# Patient Record
Sex: FEMALE | Race: WHITE | NOT HISPANIC OR LATINO | Employment: PART TIME | URBAN - METROPOLITAN AREA
[De-identification: names, ages, dates, MRNs, and addresses within clinical notes are randomized per-mention and may not be internally consistent; named-entity substitution may affect disease eponyms.]

---

## 2020-05-19 ENCOUNTER — APPOINTMENT (EMERGENCY)
Dept: RADIOLOGY | Facility: HOSPITAL | Age: 60
End: 2020-05-19
Payer: COMMERCIAL

## 2020-05-19 ENCOUNTER — HOSPITAL ENCOUNTER (EMERGENCY)
Facility: HOSPITAL | Age: 60
Discharge: HOME | End: 2020-05-19
Attending: EMERGENCY MEDICINE | Admitting: EMERGENCY MEDICINE
Payer: COMMERCIAL

## 2020-05-19 VITALS
OXYGEN SATURATION: 98 % | WEIGHT: 244.93 LBS | HEIGHT: 62 IN | SYSTOLIC BLOOD PRESSURE: 194 MMHG | BODY MASS INDEX: 45.07 KG/M2 | HEART RATE: 86 BPM | DIASTOLIC BLOOD PRESSURE: 88 MMHG | TEMPERATURE: 98.7 F | RESPIRATION RATE: 20 BRPM

## 2020-05-19 DIAGNOSIS — R03.0 ELEVATED BLOOD PRESSURE READING: ICD-10-CM

## 2020-05-19 DIAGNOSIS — G47.00 INSOMNIA: ICD-10-CM

## 2020-05-19 DIAGNOSIS — R07.89 CHEST PRESSURE: Primary | ICD-10-CM

## 2020-05-19 DIAGNOSIS — J20.9 ACUTE BRONCHITIS: ICD-10-CM

## 2020-05-19 LAB
ALBUMIN SERPL BCP-MCNC: 3.5 G/DL (ref 3.5–5)
ALP SERPL-CCNC: 78 U/L (ref 46–116)
ALT SERPL W P-5'-P-CCNC: 32 U/L (ref 12–78)
ANION GAP SERPL CALCULATED.3IONS-SCNC: 9 MMOL/L (ref 4–13)
AST SERPL W P-5'-P-CCNC: 21 U/L (ref 5–45)
ATRIAL RATE: 85 BPM
BASOPHILS # BLD AUTO: 0.01 THOUSANDS/ΜL (ref 0–0.1)
BASOPHILS NFR BLD AUTO: 0 % (ref 0–1)
BILIRUB SERPL-MCNC: 0.46 MG/DL (ref 0.2–1)
BUN SERPL-MCNC: 12 MG/DL (ref 5–25)
CALCIUM SERPL-MCNC: 8.6 MG/DL (ref 8.3–10.1)
CHLORIDE SERPL-SCNC: 111 MMOL/L (ref 100–108)
CO2 SERPL-SCNC: 26 MMOL/L (ref 21–32)
CREAT SERPL-MCNC: 0.74 MG/DL (ref 0.6–1.3)
D DIMER PPP FEU-MCNC: 0.27 UG/ML FEU
EOSINOPHIL # BLD AUTO: 0.11 THOUSAND/ΜL (ref 0–0.61)
EOSINOPHIL NFR BLD AUTO: 2 % (ref 0–6)
ERYTHROCYTE [DISTWIDTH] IN BLOOD BY AUTOMATED COUNT: 13.9 % (ref 11.6–15.1)
GFR SERPL CREATININE-BSD FRML MDRD: 89 ML/MIN/1.73SQ M
GLUCOSE SERPL-MCNC: 102 MG/DL (ref 65–140)
HCT VFR BLD AUTO: 43.1 % (ref 34.8–46.1)
HGB BLD-MCNC: 14.2 G/DL (ref 11.5–15.4)
IMM GRANULOCYTES # BLD AUTO: 0.03 THOUSAND/UL (ref 0–0.2)
IMM GRANULOCYTES NFR BLD AUTO: 1 % (ref 0–2)
LYMPHOCYTES # BLD AUTO: 1.36 THOUSANDS/ΜL (ref 0.6–4.47)
LYMPHOCYTES NFR BLD AUTO: 27 % (ref 14–44)
MCH RBC QN AUTO: 32.9 PG (ref 26.8–34.3)
MCHC RBC AUTO-ENTMCNC: 32.9 G/DL (ref 31.4–37.4)
MCV RBC AUTO: 100 FL (ref 82–98)
MONOCYTES # BLD AUTO: 0.38 THOUSAND/ΜL (ref 0.17–1.22)
MONOCYTES NFR BLD AUTO: 8 % (ref 4–12)
NEUTROPHILS # BLD AUTO: 3.12 THOUSANDS/ΜL (ref 1.85–7.62)
NEUTS SEG NFR BLD AUTO: 62 % (ref 43–75)
NRBC BLD AUTO-RTO: 0 /100 WBCS
NT-PROBNP SERPL-MCNC: 82 PG/ML
P AXIS: 61 DEGREES
PLATELET # BLD AUTO: 256 THOUSANDS/UL (ref 149–390)
PMV BLD AUTO: 8.7 FL (ref 8.9–12.7)
POTASSIUM SERPL-SCNC: 4.2 MMOL/L (ref 3.5–5.3)
PR INTERVAL: 160 MS
PROT SERPL-MCNC: 7.1 G/DL (ref 6.4–8.2)
QRS AXIS: 41 DEGREES
QRSD INTERVAL: 80 MS
QT INTERVAL: 344 MS
QTC INTERVAL: 409 MS
RBC # BLD AUTO: 4.31 MILLION/UL (ref 3.81–5.12)
SARS-COV-2 RNA RESP QL NAA+PROBE: NEGATIVE
SODIUM SERPL-SCNC: 146 MMOL/L (ref 136–145)
T WAVE AXIS: 58 DEGREES
TROPONIN I SERPL-MCNC: <0.02 NG/ML
VENTRICULAR RATE: 85 BPM
WBC # BLD AUTO: 5.01 THOUSAND/UL (ref 4.31–10.16)

## 2020-05-19 PROCEDURE — 71045 X-RAY EXAM CHEST 1 VIEW: CPT

## 2020-05-19 PROCEDURE — 87635 SARS-COV-2 COVID-19 AMP PRB: CPT | Performed by: PHYSICIAN ASSISTANT

## 2020-05-19 PROCEDURE — 84484 ASSAY OF TROPONIN QUANT: CPT | Performed by: PHYSICIAN ASSISTANT

## 2020-05-19 PROCEDURE — 85379 FIBRIN DEGRADATION QUANT: CPT | Performed by: PHYSICIAN ASSISTANT

## 2020-05-19 PROCEDURE — 93010 ELECTROCARDIOGRAM REPORT: CPT | Performed by: INTERNAL MEDICINE

## 2020-05-19 PROCEDURE — 83880 ASSAY OF NATRIURETIC PEPTIDE: CPT | Performed by: PHYSICIAN ASSISTANT

## 2020-05-19 PROCEDURE — 93005 ELECTROCARDIOGRAM TRACING: CPT

## 2020-05-19 PROCEDURE — 80053 COMPREHEN METABOLIC PANEL: CPT | Performed by: PHYSICIAN ASSISTANT

## 2020-05-19 PROCEDURE — 85025 COMPLETE CBC W/AUTO DIFF WBC: CPT | Performed by: PHYSICIAN ASSISTANT

## 2020-05-19 PROCEDURE — 36415 COLL VENOUS BLD VENIPUNCTURE: CPT | Performed by: PHYSICIAN ASSISTANT

## 2020-05-19 PROCEDURE — 99284 EMERGENCY DEPT VISIT MOD MDM: CPT | Performed by: PHYSICIAN ASSISTANT

## 2020-05-19 PROCEDURE — 99285 EMERGENCY DEPT VISIT HI MDM: CPT

## 2020-05-19 RX ORDER — ALBUTEROL SULFATE 90 UG/1
2 AEROSOL, METERED RESPIRATORY (INHALATION) ONCE
Status: COMPLETED | OUTPATIENT
Start: 2020-05-19 | End: 2020-05-19

## 2020-05-19 RX ORDER — ALBUTEROL SULFATE 90 UG/1
2 AEROSOL, METERED RESPIRATORY (INHALATION) EVERY 4 HOURS PRN
Qty: 1 INHALER | Refills: 0 | Status: SHIPPED | OUTPATIENT
Start: 2020-05-19 | End: 2020-05-19 | Stop reason: SDUPTHER

## 2020-05-19 RX ORDER — ZOLPIDEM TARTRATE 5 MG/1
5 TABLET ORAL
Qty: 10 TABLET | Refills: 0 | Status: SHIPPED | OUTPATIENT
Start: 2020-05-19 | End: 2020-09-30

## 2020-05-19 RX ORDER — PREDNISONE 10 MG/1
10 TABLET ORAL
Qty: 42 TABLET | Refills: 0 | Status: SHIPPED | OUTPATIENT
Start: 2020-05-19 | End: 2020-09-30

## 2020-05-19 RX ORDER — ALBUTEROL SULFATE 90 UG/1
2 AEROSOL, METERED RESPIRATORY (INHALATION) EVERY 4 HOURS PRN
Qty: 1 INHALER | Refills: 0 | Status: SHIPPED | OUTPATIENT
Start: 2020-05-19 | End: 2020-09-30

## 2020-05-19 RX ORDER — PREDNISONE 10 MG/1
10 TABLET ORAL
Qty: 42 TABLET | Refills: 0 | Status: SHIPPED | OUTPATIENT
Start: 2020-05-19 | End: 2020-05-19 | Stop reason: SDUPTHER

## 2020-05-19 RX ADMIN — ALBUTEROL SULFATE 2 PUFF: 90 AEROSOL, METERED RESPIRATORY (INHALATION) at 09:14

## 2020-08-28 ENCOUNTER — OFFICE VISIT (OUTPATIENT)
Dept: FAMILY MEDICINE CLINIC | Facility: CLINIC | Age: 60
End: 2020-08-28
Payer: COMMERCIAL

## 2020-08-28 ENCOUNTER — TELEPHONE (OUTPATIENT)
Dept: FAMILY MEDICINE CLINIC | Facility: CLINIC | Age: 60
End: 2020-08-28

## 2020-08-28 VITALS
BODY MASS INDEX: 45.82 KG/M2 | WEIGHT: 249 LBS | DIASTOLIC BLOOD PRESSURE: 90 MMHG | HEIGHT: 62 IN | SYSTOLIC BLOOD PRESSURE: 134 MMHG | HEART RATE: 102 BPM | TEMPERATURE: 98 F | OXYGEN SATURATION: 98 % | RESPIRATION RATE: 14 BRPM

## 2020-08-28 DIAGNOSIS — R53.83 OTHER FATIGUE: ICD-10-CM

## 2020-08-28 DIAGNOSIS — Z12.11 SCREEN FOR COLON CANCER: ICD-10-CM

## 2020-08-28 DIAGNOSIS — G47.00 INSOMNIA, UNSPECIFIED TYPE: ICD-10-CM

## 2020-08-28 DIAGNOSIS — Z12.31 SCREENING MAMMOGRAM FOR HIGH-RISK PATIENT: ICD-10-CM

## 2020-08-28 DIAGNOSIS — L03.119 CELLULITIS OF ELBOW: ICD-10-CM

## 2020-08-28 DIAGNOSIS — L03.119 CELLULITIS OF ELBOW: Primary | ICD-10-CM

## 2020-08-28 DIAGNOSIS — R06.02 SOB (SHORTNESS OF BREATH): ICD-10-CM

## 2020-08-28 DIAGNOSIS — Z13.220 SCREENING CHOLESTEROL LEVEL: ICD-10-CM

## 2020-08-28 PROCEDURE — 3725F SCREEN DEPRESSION PERFORMED: CPT | Performed by: FAMILY MEDICINE

## 2020-08-28 PROCEDURE — 3008F BODY MASS INDEX DOCD: CPT | Performed by: FAMILY MEDICINE

## 2020-08-28 PROCEDURE — 1036F TOBACCO NON-USER: CPT | Performed by: FAMILY MEDICINE

## 2020-08-28 PROCEDURE — 99244 OFF/OP CNSLTJ NEW/EST MOD 40: CPT | Performed by: FAMILY MEDICINE

## 2020-08-28 RX ORDER — CEPHALEXIN 500 MG/1
500 CAPSULE ORAL EVERY 6 HOURS SCHEDULED
Qty: 40 CAPSULE | Refills: 0 | Status: SHIPPED | OUTPATIENT
Start: 2020-08-28 | End: 2020-08-28 | Stop reason: SDUPTHER

## 2020-08-28 RX ORDER — CEPHALEXIN 500 MG/1
500 CAPSULE ORAL EVERY 6 HOURS SCHEDULED
Qty: 40 CAPSULE | Refills: 0 | Status: SHIPPED | OUTPATIENT
Start: 2020-08-28 | End: 2020-09-07

## 2020-08-28 RX ORDER — ZOLPIDEM TARTRATE 10 MG/1
10 TABLET ORAL
Qty: 30 TABLET | Refills: 1 | Status: SHIPPED | OUTPATIENT
Start: 2020-08-28 | End: 2020-08-28 | Stop reason: SDUPTHER

## 2020-08-28 RX ORDER — ZOLPIDEM TARTRATE 10 MG/1
10 TABLET ORAL
Qty: 30 TABLET | Refills: 1 | Status: SHIPPED | OUTPATIENT
Start: 2020-08-28 | End: 2020-10-06 | Stop reason: SDUPTHER

## 2020-08-28 NOTE — PROGRESS NOTES
BMI Counseling: Body mass index is 45 54 kg/m²  The BMI is above normal  Nutrition recommendations include decreasing portion sizes, encouraging healthy choices of fruits and vegetables, limiting drinks that contain sugar, moderation in carbohydrate intake, increasing intake of lean protein, reducing intake of saturated and trans fat and reducing intake of cholesterol  Exercise recommendations include exercising 3-5 times per week  No pharmacotherapy was ordered  Assessment/Plan:      There are no diagnoses linked to this encounter  Subjective:     Patient ID: Katelynn Johnson is a 61 y o  female  Pt  For her initil exam today  Pt  With some issues  With cough and wheezing  Pt states thus has  Shyam [place] going on for a while  Pt  Was a part time  Smoker  And  A pack would last a week  Pt  hada  Cigarette  Months  Ago and is  Not  Smoking  Pt  Also with insomnia  Pt has  Used  Melatonin and  nyquil and  Not really helping  Pt  Will use ETOH at  [facility] to help  Her sleep  Pt does  Have issues with sleep  And will wake up with  Gasping  Pt with an Epsworth sleep scale of 11         Review of Systems      Objective:     Physical Exam

## 2020-08-28 NOTE — TELEPHONE ENCOUNTER
Pt  called, she said she needed the Keflex and Ambian to be sent to [facility] [place]  [address] on [address]

## 2020-09-10 ENCOUNTER — TELEPHONE (OUTPATIENT)
Dept: FAMILY MEDICINE CLINIC | Facility: CLINIC | Age: 60
End: 2020-09-10

## 2020-09-10 DIAGNOSIS — B37.9 YEAST INFECTION: Primary | ICD-10-CM

## 2020-09-10 RX ORDER — FLUCONAZOLE 150 MG/1
150 TABLET ORAL ONCE
Qty: 1 TABLET | Refills: 0 | Status: SHIPPED | OUTPATIENT
Start: 2020-09-10 | End: 2020-09-10

## 2020-09-10 NOTE — TELEPHONE ENCOUNTER
Patient was given keflex at her last visit for cellulitis and has since developed a yeast infection   Could you please send in diflucan for her to [facility]

## 2020-09-17 ENCOUNTER — HOSPITAL ENCOUNTER (OUTPATIENT)
Dept: PULMONOLOGY | Facility: HOSPITAL | Age: 60
Discharge: HOME | End: 2020-09-17
Payer: COMMERCIAL

## 2020-09-17 DIAGNOSIS — R06.02 SOB (SHORTNESS OF BREATH): ICD-10-CM

## 2020-09-17 PROCEDURE — 94010 BREATHING CAPACITY TEST: CPT | Performed by: INTERNAL MEDICINE

## 2020-09-17 PROCEDURE — 94760 N-INVAS EAR/PLS OXIMETRY 1: CPT

## 2020-09-17 PROCEDURE — 94727 GAS DIL/WSHOT DETER LNG VOL: CPT | Performed by: INTERNAL MEDICINE

## 2020-09-17 PROCEDURE — 94010 BREATHING CAPACITY TEST: CPT

## 2020-09-17 PROCEDURE — 94729 DIFFUSING CAPACITY: CPT | Performed by: INTERNAL MEDICINE

## 2020-09-17 PROCEDURE — 94727 GAS DIL/WSHOT DETER LNG VOL: CPT

## 2020-09-17 PROCEDURE — 94729 DIFFUSING CAPACITY: CPT

## 2020-09-18 ENCOUNTER — OFFICE VISIT (OUTPATIENT)
Dept: FAMILY MEDICINE CLINIC | Facility: CLINIC | Age: 60
End: 2020-09-18
Payer: COMMERCIAL

## 2020-09-18 VITALS
OXYGEN SATURATION: 95 % | WEIGHT: 250 LBS | SYSTOLIC BLOOD PRESSURE: 148 MMHG | HEIGHT: 62 IN | RESPIRATION RATE: 18 BRPM | BODY MASS INDEX: 46.01 KG/M2 | DIASTOLIC BLOOD PRESSURE: 98 MMHG | HEART RATE: 92 BPM | TEMPERATURE: 98.6 F

## 2020-09-18 DIAGNOSIS — B35.6 TINEA CRURIS: Primary | ICD-10-CM

## 2020-09-18 DIAGNOSIS — G47.00 INSOMNIA, UNSPECIFIED TYPE: ICD-10-CM

## 2020-09-18 DIAGNOSIS — Z01.419 WELL FEMALE EXAM WITH ROUTINE GYNECOLOGICAL EXAM: ICD-10-CM

## 2020-09-18 DIAGNOSIS — K44.9 HIATAL HERNIA: ICD-10-CM

## 2020-09-18 DIAGNOSIS — R06.02 SOB (SHORTNESS OF BREATH): ICD-10-CM

## 2020-09-18 PROCEDURE — 99213 OFFICE O/P EST LOW 20 MIN: CPT | Performed by: FAMILY MEDICINE

## 2020-09-18 RX ORDER — KETOCONAZOLE 20 MG/G
CREAM TOPICAL DAILY
Qty: 60 G | Refills: 1 | Status: SHIPPED | OUTPATIENT
Start: 2020-09-18 | End: 2021-02-17 | Stop reason: CLARIF

## 2020-09-18 RX ORDER — EPINEPHRINE INHALATION 125 UG/1
AEROSOL RESPIRATORY (INHALATION)
COMMUNITY
End: 2021-02-17 | Stop reason: CLARIF

## 2020-09-18 NOTE — PROGRESS NOTES
Assessment/Plan:         Problem List Items Addressed This Visit     None            Subjective:      Patient ID: Shivani Sood is a 61 y o  female  Pt here for  cehckup on yeast infestion in her vaginal area  Pt  states she had keflex  Previously and had  A yeast infection and had  Diflucan cause   Diarrhea  Pt also with  A question  About a hiatal hernia and would like to be checked for that     Pt with heartburn and belching  The following portions of the patient's history were reviewed and updated as appropriate:   She has a past medical history of Asthma ,  does not have any pertinent problems on file  ,   has no past surgical history on file  ,  family history includes Bipolar disorder in her son; Heart disease in her mother and sister; Stroke in her mother  ,   reports that she quit smoking about 6 months ago  She has never used smokeless tobacco  She reports current alcohol use of about 1 0 standard drinks of alcohol per week  She reports that she does not use drugs  ,  is allergic to augmentin [amoxicillin-pot clavulanate]     Current Outpatient Medications   Medication Sig Dispense Refill    EPINEPHrine (Primatene Mist) 0 125 MG/ACT AERO Inhale      zolpidem (AMBIEN) 10 mg tablet Take 1 tablet (10 mg total) by mouth daily at bedtime as needed for sleep 30 tablet 1    albuterol (PROVENTIL HFA,VENTOLIN HFA) 90 mcg/act inhaler Inhale 2 puffs every 4 (four) hours as needed for wheezing or shortness of breath (Patient not taking: Reported on 9/18/2020) 1 Inhaler 0    predniSONE 10 mg tablet Take 1 tablet (10 mg total) by mouth daily with breakfast 6 tabs PO qDaily x2 days, 5 tabs PO qD x 2d, 4 tabs PO qD x 2d, 3 tabs PO qD x 2d, 2 tabs PO qD x 2d, 1 tab PO qD x 2d (Patient not taking: Reported on 8/28/2020) 42 tablet 0    zolpidem (AMBIEN) 5 mg tablet Take 1 tablet (5 mg total) by mouth daily at bedtime as needed for sleep (Patient not taking: Reported on 8/28/2020) 10 tablet 0     No current facility-administered medications for this visit  Review of Systems   Constitutional: Negative for activity change, appetite change, fatigue and fever  HENT: Negative for congestion, ear pain, postnasal drip, rhinorrhea, sinus pressure, sinus pain, sneezing and sore throat  Eyes: Negative for pain and redness  Respiratory: Negative for apnea, cough, chest tightness, shortness of breath and wheezing  Cardiovascular: Negative for chest pain, palpitations and leg swelling  Gastrointestinal: Negative for abdominal pain, constipation, diarrhea, nausea and vomiting  Endocrine: Negative for cold intolerance and heat intolerance  Genitourinary: Negative for difficulty urinating, dysuria, frequency, hematuria and urgency  Musculoskeletal: Negative for arthralgias, back pain, gait problem and myalgias  Skin: Positive for rash  Neurological: Negative for dizziness, speech difficulty, weakness, numbness and headaches  Hematological: Does not bruise/bleed easily  Psychiatric/Behavioral: Negative for agitation, confusion and hallucinations  Objective:  Vitals:    09/18/20 1426   BP: 148/98   BP Location: Left arm   Patient Position: Sitting   Cuff Size: Adult   Pulse: 92   Resp: 18   Temp: 98 6 °F (37 °C)   TempSrc: Temporal   SpO2: 95%   Weight: 113 kg (250 lb)   Height: 5' 2" (1 575 m)     Body mass index is 45 73 kg/m²  Physical Exam  Vitals signs and nursing note reviewed  Constitutional:       Appearance: She is well-developed  HENT:      Head: Normocephalic and atraumatic  Nose: Nose normal    Eyes:      General: No scleral icterus  Conjunctiva/sclera: Conjunctivae normal       Pupils: Pupils are equal, round, and reactive to light  Neck:      Musculoskeletal: Normal range of motion and neck supple  Thyroid: No thyromegaly  Cardiovascular:      Rate and Rhythm: Normal rate and regular rhythm     Pulmonary:      Effort: Pulmonary effort is normal       Breath sounds: Normal breath sounds  No wheezing  Abdominal:      General: Bowel sounds are normal  There is no distension  Palpations: Abdomen is soft  Tenderness: There is no abdominal tenderness  There is no guarding or rebound  Musculoskeletal: Normal range of motion  General: No tenderness or deformity  Skin:     General: Skin is warm and dry  Findings: No erythema or rash  Neurological:      Mental Status: She is alert and oriented to person, place, and time  Sensory: No sensory deficit  Psychiatric:         Behavior: Behavior normal          Thought Content:  Thought content normal          Judgment: Judgment normal

## 2020-09-28 ENCOUNTER — HOSPITAL ENCOUNTER (OUTPATIENT)
Dept: MAMMOGRAPHY | Facility: CLINIC | Age: 60
Discharge: HOME | End: 2020-09-28
Payer: COMMERCIAL

## 2020-09-28 ENCOUNTER — APPOINTMENT (OUTPATIENT)
Dept: LAB | Facility: HOSPITAL | Age: 60
End: 2020-09-28
Payer: COMMERCIAL

## 2020-09-28 VITALS — BODY MASS INDEX: 49.08 KG/M2 | WEIGHT: 250 LBS | HEIGHT: 60 IN

## 2020-09-28 DIAGNOSIS — R53.83 OTHER FATIGUE: Primary | ICD-10-CM

## 2020-09-28 DIAGNOSIS — Z12.31 SCREENING MAMMOGRAM FOR HIGH-RISK PATIENT: ICD-10-CM

## 2020-09-28 DIAGNOSIS — Z13.220 SCREENING CHOLESTEROL LEVEL: ICD-10-CM

## 2020-09-28 LAB
ALBUMIN SERPL BCP-MCNC: 3.2 G/DL (ref 3.5–5)
ALP SERPL-CCNC: 92 U/L (ref 46–116)
ALT SERPL W P-5'-P-CCNC: 32 U/L (ref 12–78)
ANION GAP SERPL CALCULATED.3IONS-SCNC: 8 MMOL/L (ref 4–13)
AST SERPL W P-5'-P-CCNC: 22 U/L (ref 5–45)
BACTERIA UR QL AUTO: ABNORMAL /HPF
BASOPHILS # BLD AUTO: 0.02 THOUSANDS/ΜL (ref 0–0.1)
BASOPHILS NFR BLD AUTO: 0 % (ref 0–1)
BILIRUB SERPL-MCNC: 0.3 MG/DL (ref 0.2–1)
BILIRUB UR QL STRIP: NEGATIVE
BUN SERPL-MCNC: 7 MG/DL (ref 5–25)
CALCIUM ALBUM COR SERPL-MCNC: 9.8 MG/DL (ref 8.3–10.1)
CALCIUM SERPL-MCNC: 9.2 MG/DL (ref 8.3–10.1)
CHLORIDE SERPL-SCNC: 108 MMOL/L (ref 100–108)
CHOLEST SERPL-MCNC: 262 MG/DL (ref 50–200)
CLARITY UR: ABNORMAL
CO2 SERPL-SCNC: 31 MMOL/L (ref 21–32)
COLOR UR: YELLOW
CREAT SERPL-MCNC: 0.75 MG/DL (ref 0.6–1.3)
EOSINOPHIL # BLD AUTO: 0.12 THOUSAND/ΜL (ref 0–0.61)
EOSINOPHIL NFR BLD AUTO: 2 % (ref 0–6)
ERYTHROCYTE [DISTWIDTH] IN BLOOD BY AUTOMATED COUNT: 12.9 % (ref 11.6–15.1)
GFR SERPL CREATININE-BSD FRML MDRD: 87 ML/MIN/1.73SQ M
GLUCOSE P FAST SERPL-MCNC: 94 MG/DL (ref 65–99)
GLUCOSE UR STRIP-MCNC: NEGATIVE MG/DL
HCT VFR BLD AUTO: 41.8 % (ref 34.8–46.1)
HDLC SERPL-MCNC: 79 MG/DL
HGB BLD-MCNC: 13.1 G/DL (ref 11.5–15.4)
HGB UR QL STRIP.AUTO: NEGATIVE
IMM GRANULOCYTES # BLD AUTO: 0.03 THOUSAND/UL (ref 0–0.2)
IMM GRANULOCYTES NFR BLD AUTO: 1 % (ref 0–2)
KETONES UR STRIP-MCNC: NEGATIVE MG/DL
LDLC SERPL CALC-MCNC: 152 MG/DL (ref 0–100)
LEUKOCYTE ESTERASE UR QL STRIP: ABNORMAL
LYMPHOCYTES # BLD AUTO: 1.64 THOUSANDS/ΜL (ref 0.6–4.47)
LYMPHOCYTES NFR BLD AUTO: 29 % (ref 14–44)
MAGNESIUM SERPL-MCNC: 2.1 MG/DL (ref 1.6–2.6)
MCH RBC QN AUTO: 32.4 PG (ref 26.8–34.3)
MCHC RBC AUTO-ENTMCNC: 31.3 G/DL (ref 31.4–37.4)
MCV RBC AUTO: 104 FL (ref 82–98)
MONOCYTES # BLD AUTO: 0.44 THOUSAND/ΜL (ref 0.17–1.22)
MONOCYTES NFR BLD AUTO: 8 % (ref 4–12)
NEUTROPHILS # BLD AUTO: 3.38 THOUSANDS/ΜL (ref 1.85–7.62)
NEUTS SEG NFR BLD AUTO: 60 % (ref 43–75)
NITRITE UR QL STRIP: NEGATIVE
NON-SQ EPI CELLS URNS QL MICRO: ABNORMAL /HPF
NRBC BLD AUTO-RTO: 0 /100 WBCS
PH UR STRIP.AUTO: 5.5 [PH]
PLATELET # BLD AUTO: 325 THOUSANDS/UL (ref 149–390)
PMV BLD AUTO: 9 FL (ref 8.9–12.7)
POTASSIUM SERPL-SCNC: 3.6 MMOL/L (ref 3.5–5.3)
PROT SERPL-MCNC: 7.4 G/DL (ref 6.4–8.2)
PROT UR STRIP-MCNC: NEGATIVE MG/DL
RBC # BLD AUTO: 4.04 MILLION/UL (ref 3.81–5.12)
RBC #/AREA URNS AUTO: ABNORMAL /HPF
SODIUM SERPL-SCNC: 147 MMOL/L (ref 136–145)
SP GR UR STRIP.AUTO: 1.02 (ref 1–1.03)
T4 FREE SERPL-MCNC: 0.94 NG/DL (ref 0.76–1.46)
TRIGL SERPL-MCNC: 156 MG/DL
TSH SERPL DL<=0.05 MIU/L-ACNC: 0.96 UIU/ML (ref 0.36–3.74)
URATE SERPL-MCNC: 6.2 MG/DL (ref 2–6.8)
UROBILINOGEN UR QL STRIP.AUTO: 0.2 E.U./DL
WBC # BLD AUTO: 5.63 THOUSAND/UL (ref 4.31–10.16)
WBC #/AREA URNS AUTO: ABNORMAL /HPF

## 2020-09-28 PROCEDURE — 77063 BREAST TOMOSYNTHESIS BI: CPT

## 2020-09-28 PROCEDURE — 77067 SCR MAMMO BI INCL CAD: CPT

## 2020-09-28 PROCEDURE — 80053 COMPREHEN METABOLIC PANEL: CPT

## 2020-09-28 PROCEDURE — 84443 ASSAY THYROID STIM HORMONE: CPT

## 2020-09-28 PROCEDURE — 36415 COLL VENOUS BLD VENIPUNCTURE: CPT

## 2020-09-28 PROCEDURE — 81001 URINALYSIS AUTO W/SCOPE: CPT | Performed by: FAMILY MEDICINE

## 2020-09-28 PROCEDURE — 83735 ASSAY OF MAGNESIUM: CPT

## 2020-09-28 PROCEDURE — 85025 COMPLETE CBC W/AUTO DIFF WBC: CPT

## 2020-09-28 PROCEDURE — 84439 ASSAY OF FREE THYROXINE: CPT

## 2020-09-28 PROCEDURE — 80061 LIPID PANEL: CPT

## 2020-09-28 PROCEDURE — 84550 ASSAY OF BLOOD/URIC ACID: CPT

## 2020-09-29 ENCOUNTER — ANNUAL EXAM (OUTPATIENT)
Dept: OBGYN CLINIC | Facility: CLINIC | Age: 60
End: 2020-09-29
Payer: COMMERCIAL

## 2020-09-29 VITALS — HEIGHT: 60 IN | WEIGHT: 250 LBS | BODY MASS INDEX: 49.08 KG/M2

## 2020-09-29 DIAGNOSIS — Z01.411 ENCOUNTER FOR GYNECOLOGICAL EXAMINATION WITH ABNORMAL FINDING: Primary | ICD-10-CM

## 2020-09-29 DIAGNOSIS — N76.0 ACUTE VAGINITIS: ICD-10-CM

## 2020-09-29 DIAGNOSIS — Z12.4 SCREENING FOR MALIGNANT NEOPLASM OF THE CERVIX: ICD-10-CM

## 2020-09-29 DIAGNOSIS — Z13.820 SCREENING FOR OSTEOPOROSIS: ICD-10-CM

## 2020-09-29 DIAGNOSIS — Z11.51 SCREENING FOR HUMAN PAPILLOMAVIRUS: ICD-10-CM

## 2020-09-29 PROCEDURE — G0145 SCR C/V CYTO,THINLAYER,RESCR: HCPCS | Performed by: OBSTETRICS & GYNECOLOGY

## 2020-09-29 PROCEDURE — 87624 HPV HI-RISK TYP POOLED RSLT: CPT | Performed by: OBSTETRICS & GYNECOLOGY

## 2020-09-29 PROCEDURE — 87660 TRICHOMONAS VAGIN DIR PROBE: CPT | Performed by: OBSTETRICS & GYNECOLOGY

## 2020-09-29 PROCEDURE — 87510 GARDNER VAG DNA DIR PROBE: CPT | Performed by: OBSTETRICS & GYNECOLOGY

## 2020-09-29 PROCEDURE — 87480 CANDIDA DNA DIR PROBE: CPT | Performed by: OBSTETRICS & GYNECOLOGY

## 2020-09-29 PROCEDURE — 99386 PREV VISIT NEW AGE 40-64: CPT | Performed by: OBSTETRICS & GYNECOLOGY

## 2020-09-29 RX ORDER — CLOTRIMAZOLE AND BETAMETHASONE DIPROPIONATE 10; .64 MG/G; MG/G
CREAM TOPICAL 2 TIMES DAILY
Qty: 45 G | Refills: 2 | Status: SHIPPED | OUTPATIENT
Start: 2020-09-29 | End: 2020-09-30

## 2020-09-29 RX ORDER — FLUCONAZOLE 150 MG/1
150 TABLET ORAL ONCE
Qty: 1 TABLET | Refills: 2 | Status: SHIPPED | OUTPATIENT
Start: 2020-09-29 | End: 2020-09-30

## 2020-09-29 NOTE — PROGRESS NOTES
Assessment/Plan:    No problem-specific Assessment & Plan notes found for this encounter  Diagnoses and all orders for this visit:    Encounter for gynecological examination with abnormal finding    Screening for malignant neoplasm of the cervix  -     Liquid-based pap, screening    Screening for human papillomavirus  -     Liquid-based pap, screening    Screening for osteoporosis  -     DXA bone density spine hip and pelvis; Future    Acute vaginitis  -     fluconazole (DIFLUCAN) 150 mg tablet; Take 1 tablet (150 mg total) by mouth once for 1 dose  -     clotrimazole-betamethasone (LOTRISONE) 1-0 05 % cream; Apply topically 2 (two) times a day  -     VAGINOSIS DNA PROBE (AFFIRM)          Subjective:      Patient ID: Juan Velasquez is a 61 y o  female  HPI    The following portions of the patient's history were reviewed and updated as appropriate: allergies, current medications, past family history, past medical history, past social history, past surgical history and problem list     Review of Systems   Constitutional: Negative for chills, diaphoresis, fatigue, fever and unexpected weight change  HENT: Negative for congestion, sinus pressure, sinus pain, tinnitus and trouble swallowing  Eyes: Negative for visual disturbance  Respiratory: Negative for cough, chest tightness and shortness of breath  Cardiovascular: Negative for chest pain, palpitations and leg swelling  Gastrointestinal: Negative for abdominal distention, abdominal pain, anal bleeding, constipation, diarrhea, nausea, rectal pain and vomiting  Endocrine: Negative for heat intolerance  Genitourinary: Negative for difficulty urinating, dysuria, flank pain, frequency, genital sores, hematuria and urgency  Musculoskeletal: Negative for arthralgias, back pain and joint swelling  Skin: Negative for rash  Allergic/Immunologic: Negative for environmental allergies and food allergies  Neurological: Negative for headaches  Hematological: Negative for adenopathy  Does not bruise/bleed easily  Psychiatric/Behavioral: Negative for decreased concentration and dysphoric mood  The patient is not nervous/anxious  Objective:      Ht 5' (1 524 m)   Wt 113 kg (250 lb)   BMI 48 82 kg/m²          Physical Exam  Vitals signs and nursing note reviewed  Exam conducted with a chaperone present  Constitutional:       General: She is not in acute distress  Appearance: Normal appearance  She is normal weight  She is not ill-appearing  HENT:      Head: Normocephalic  Nose: Nose normal       Mouth/Throat:      Mouth: Mucous membranes are moist       Pharynx: Oropharynx is clear  Eyes:      Conjunctiva/sclera: Conjunctivae normal       Pupils: Pupils are equal, round, and reactive to light  Neck:      Musculoskeletal: Neck supple  Cardiovascular:      Rate and Rhythm: Normal rate and regular rhythm  Pulses: Normal pulses  Pulmonary:      Effort: Pulmonary effort is normal       Breath sounds: Normal breath sounds  Abdominal:      General: Abdomen is flat  Bowel sounds are normal       Palpations: Abdomen is soft  Genitourinary:     General: Normal vulva  Exam position: Lithotomy position  Brenden stage (genital): 5       Vagina: Normal       Cervix: Normal       Uterus: Normal        Adnexa: Right adnexa normal and left adnexa normal       Rectum: Normal       Comments: Some erythema around perineal area; mild amount of vaginal discharge  Musculoskeletal: Normal range of motion  Skin:     General: Skin is warm and dry  Neurological:      General: No focal deficit present  Mental Status: She is alert     Psychiatric:         Mood and Affect: Mood normal

## 2020-09-30 ENCOUNTER — OFFICE VISIT (OUTPATIENT)
Dept: FAMILY MEDICINE CLINIC | Facility: CLINIC | Age: 60
End: 2020-09-30
Payer: COMMERCIAL

## 2020-09-30 VITALS
HEART RATE: 87 BPM | BODY MASS INDEX: 49.28 KG/M2 | SYSTOLIC BLOOD PRESSURE: 132 MMHG | TEMPERATURE: 97.5 F | HEIGHT: 60 IN | DIASTOLIC BLOOD PRESSURE: 84 MMHG | RESPIRATION RATE: 20 BRPM | OXYGEN SATURATION: 97 % | WEIGHT: 251 LBS

## 2020-09-30 DIAGNOSIS — Z23 IMMUNIZATION DUE: ICD-10-CM

## 2020-09-30 DIAGNOSIS — G47.00 INSOMNIA, UNSPECIFIED TYPE: Primary | ICD-10-CM

## 2020-09-30 PROCEDURE — 90670 PCV13 VACCINE IM: CPT | Performed by: FAMILY MEDICINE

## 2020-09-30 PROCEDURE — 99213 OFFICE O/P EST LOW 20 MIN: CPT | Performed by: FAMILY MEDICINE

## 2020-09-30 PROCEDURE — 90682 RIV4 VACC RECOMBINANT DNA IM: CPT | Performed by: FAMILY MEDICINE

## 2020-09-30 PROCEDURE — 90471 IMMUNIZATION ADMIN: CPT | Performed by: FAMILY MEDICINE

## 2020-09-30 PROCEDURE — 90472 IMMUNIZATION ADMIN EACH ADD: CPT | Performed by: FAMILY MEDICINE

## 2020-09-30 RX ORDER — ZOLPIDEM TARTRATE 12.5 MG/1
12.5 TABLET, FILM COATED, EXTENDED RELEASE ORAL
Qty: 7 TABLET | Refills: 1 | Status: SHIPPED | OUTPATIENT
Start: 2020-09-30 | End: 2020-10-13 | Stop reason: SDUPTHER

## 2020-09-30 NOTE — PROGRESS NOTES
Assessment/Plan:         Problem List Items Addressed This Visit     None            Subjective:      Patient ID: Camilo Santana is a 61 y o  female  Pt here for insomnia  Checkup  And Tom Farrar is not keeping her to sleep all night  The following portions of the patient's history were reviewed and updated as appropriate:   She has a past medical history of Asthma ,  does not have any pertinent problems on file  ,   has no past surgical history on file  ,  family history includes Bipolar disorder in her son; Breast cancer (age of onset: 76) in her mother; Heart disease in her mother and sister; No Known Problems in her daughter, maternal aunt, and sister; Stroke in her mother  ,   reports that she quit smoking about 6 months ago  She has never used smokeless tobacco  She reports current alcohol use of about 1 0 standard drinks of alcohol per week  She reports that she does not use drugs  ,  is allergic to augmentin [amoxicillin-pot clavulanate] and keflex [cephalexin]     Current Outpatient Medications   Medication Sig Dispense Refill    fluconazole (DIFLUCAN) 150 mg tablet Take 1 tablet (150 mg total) by mouth once for 1 dose 1 tablet 2    ketoconazole (NIZORAL) 2 % cream Apply topically daily 60 g 1    zolpidem (AMBIEN) 10 mg tablet Take 1 tablet (10 mg total) by mouth daily at bedtime as needed for sleep 30 tablet 1    EPINEPHrine (Primatene Mist) 0 125 MG/ACT AERO Inhale       No current facility-administered medications for this visit  Review of Systems   Constitutional: Negative for activity change, appetite change, fatigue and fever  HENT: Negative for congestion, ear pain, postnasal drip, rhinorrhea, sinus pressure, sinus pain, sneezing and sore throat  Eyes: Negative for pain and redness  Respiratory: Negative for apnea, cough, chest tightness, shortness of breath and wheezing  Cardiovascular: Negative for chest pain, palpitations and leg swelling     Gastrointestinal: Negative for abdominal pain, constipation, diarrhea, nausea and vomiting  Endocrine: Negative for cold intolerance and heat intolerance  Genitourinary: Negative for difficulty urinating, dysuria, frequency, hematuria and urgency  Musculoskeletal: Negative for arthralgias, back pain, gait problem and myalgias  Skin: Negative for rash  Neurological: Negative for dizziness, speech difficulty, weakness, numbness and headaches  Hematological: Does not bruise/bleed easily  Psychiatric/Behavioral: Negative for agitation, confusion and hallucinations  Objective:  Vitals:    09/30/20 1255   BP: 132/84   BP Location: Left arm   Patient Position: Sitting   Cuff Size: Large   Pulse: 87   Resp: 20   Temp: 97 5 °F (36 4 °C)   TempSrc: Temporal   SpO2: 97%   Weight: 114 kg (251 lb)   Height: 5' (1 524 m)     Body mass index is 49 02 kg/m²       Physical Exam

## 2020-10-01 LAB
CANDIDA RRNA VAG QL PROBE: NEGATIVE
G VAGINALIS RRNA GENITAL QL PROBE: NEGATIVE
T VAGINALIS RRNA GENITAL QL PROBE: NEGATIVE

## 2020-10-05 LAB
HPV HR 12 DNA CVX QL NAA+PROBE: NEGATIVE
HPV16 DNA CVX QL NAA+PROBE: NEGATIVE
HPV18 DNA CVX QL NAA+PROBE: NEGATIVE

## 2020-10-06 DIAGNOSIS — G47.00 INSOMNIA, UNSPECIFIED TYPE: ICD-10-CM

## 2020-10-06 LAB
LAB AP GYN PRIMARY INTERPRETATION: NORMAL
Lab: NORMAL

## 2020-10-07 ENCOUNTER — HOSPITAL ENCOUNTER (OUTPATIENT)
Dept: MAMMOGRAPHY | Facility: CLINIC | Age: 60
Discharge: HOME | End: 2020-10-07
Payer: COMMERCIAL

## 2020-10-07 ENCOUNTER — HOSPITAL ENCOUNTER (OUTPATIENT)
Dept: ULTRASOUND IMAGING | Facility: CLINIC | Age: 60
Discharge: HOME | End: 2020-10-07
Payer: COMMERCIAL

## 2020-10-07 DIAGNOSIS — R92.8 ABNORMAL MAMMOGRAM: ICD-10-CM

## 2020-10-07 PROCEDURE — 76642 ULTRASOUND BREAST LIMITED: CPT

## 2020-10-07 PROCEDURE — 77065 DX MAMMO INCL CAD UNI: CPT

## 2020-10-07 PROCEDURE — G0279 TOMOSYNTHESIS, MAMMO: HCPCS

## 2020-10-08 RX ORDER — ZOLPIDEM TARTRATE 10 MG/1
10 TABLET ORAL
Qty: 30 TABLET | Refills: 1 | Status: SHIPPED | OUTPATIENT
Start: 2020-10-08 | End: 2020-10-13

## 2020-10-09 ENCOUNTER — TELEPHONE (OUTPATIENT)
Dept: FAMILY MEDICINE CLINIC | Facility: CLINIC | Age: 60
End: 2020-10-09

## 2020-10-13 DIAGNOSIS — G47.00 INSOMNIA, UNSPECIFIED TYPE: ICD-10-CM

## 2020-10-13 DIAGNOSIS — R92.8 ABNORMAL MAMMOGRAM OF LEFT BREAST: Primary | ICD-10-CM

## 2020-10-13 RX ORDER — ZOLPIDEM TARTRATE 12.5 MG/1
12.5 TABLET, FILM COATED, EXTENDED RELEASE ORAL
Qty: 30 TABLET | Refills: 1 | Status: SHIPPED | OUTPATIENT
Start: 2020-10-13 | End: 2021-02-17 | Stop reason: CLARIF

## 2020-10-15 ENCOUNTER — CONSULT (OUTPATIENT)
Dept: GASTROENTEROLOGY | Facility: CLINIC | Age: 60
End: 2020-10-15
Payer: COMMERCIAL

## 2020-10-15 ENCOUNTER — TELEPHONE (OUTPATIENT)
Dept: GASTROENTEROLOGY | Facility: CLINIC | Age: 60
End: 2020-10-15

## 2020-10-15 VITALS
HEIGHT: 60 IN | DIASTOLIC BLOOD PRESSURE: 100 MMHG | SYSTOLIC BLOOD PRESSURE: 160 MMHG | TEMPERATURE: 97.1 F | WEIGHT: 245 LBS | HEART RATE: 102 BPM | BODY MASS INDEX: 48.1 KG/M2

## 2020-10-15 DIAGNOSIS — K44.9 HIATAL HERNIA: ICD-10-CM

## 2020-10-15 DIAGNOSIS — K21.9 GASTROESOPHAGEAL REFLUX DISEASE WITHOUT ESOPHAGITIS: Primary | ICD-10-CM

## 2020-10-15 PROCEDURE — 1036F TOBACCO NON-USER: CPT | Performed by: PHYSICIAN ASSISTANT

## 2020-10-15 PROCEDURE — 99204 OFFICE O/P NEW MOD 45 MIN: CPT | Performed by: PHYSICIAN ASSISTANT

## 2020-10-15 RX ORDER — PANTOPRAZOLE SODIUM 40 MG/1
40 TABLET, DELAYED RELEASE ORAL DAILY
Qty: 30 TABLET | Refills: 1 | Status: SHIPPED | OUTPATIENT
Start: 2020-10-15 | End: 2020-11-28

## 2020-10-19 ENCOUNTER — TELEPHONE (OUTPATIENT)
Dept: GASTROENTEROLOGY | Facility: CLINIC | Age: 60
End: 2020-10-19

## 2020-11-02 ENCOUNTER — OFFICE VISIT (OUTPATIENT)
Dept: FAMILY MEDICINE CLINIC | Facility: CLINIC | Age: 60
End: 2020-11-02
Payer: COMMERCIAL

## 2020-11-02 VITALS
OXYGEN SATURATION: 98 % | SYSTOLIC BLOOD PRESSURE: 144 MMHG | DIASTOLIC BLOOD PRESSURE: 86 MMHG | WEIGHT: 244 LBS | HEIGHT: 60 IN | RESPIRATION RATE: 18 BRPM | BODY MASS INDEX: 47.91 KG/M2 | HEART RATE: 102 BPM | TEMPERATURE: 98 F

## 2020-11-02 DIAGNOSIS — Z01.818 PREOPERATIVE CLEARANCE: Primary | ICD-10-CM

## 2020-11-02 PROCEDURE — 1036F TOBACCO NON-USER: CPT | Performed by: NURSE PRACTITIONER

## 2020-11-02 PROCEDURE — 93000 ELECTROCARDIOGRAM COMPLETE: CPT | Performed by: NURSE PRACTITIONER

## 2020-11-02 PROCEDURE — 3008F BODY MASS INDEX DOCD: CPT | Performed by: NURSE PRACTITIONER

## 2020-11-02 PROCEDURE — 99213 OFFICE O/P EST LOW 20 MIN: CPT | Performed by: NURSE PRACTITIONER

## 2020-11-28 DIAGNOSIS — K21.9 GASTROESOPHAGEAL REFLUX DISEASE WITHOUT ESOPHAGITIS: ICD-10-CM

## 2020-11-28 RX ORDER — PANTOPRAZOLE SODIUM 40 MG/1
TABLET, DELAYED RELEASE ORAL
Qty: 30 TABLET | Refills: 1 | Status: SHIPPED | OUTPATIENT
Start: 2020-11-28 | End: 2021-01-04

## 2020-12-04 ENCOUNTER — TELEMEDICINE (OUTPATIENT)
Dept: FAMILY MEDICINE CLINIC | Facility: CLINIC | Age: 60
End: 2020-12-04
Payer: COMMERCIAL

## 2020-12-04 DIAGNOSIS — G47.00 INSOMNIA, UNSPECIFIED TYPE: Primary | ICD-10-CM

## 2020-12-04 DIAGNOSIS — G47.00 INSOMNIA, UNSPECIFIED TYPE: ICD-10-CM

## 2020-12-04 DIAGNOSIS — K21.9 GASTROESOPHAGEAL REFLUX DISEASE WITHOUT ESOPHAGITIS: ICD-10-CM

## 2020-12-04 PROCEDURE — G2012 BRIEF CHECK IN BY MD/QHP: HCPCS | Performed by: FAMILY MEDICINE

## 2020-12-04 RX ORDER — ZOLPIDEM TARTRATE 12.5 MG/1
12.5 TABLET, FILM COATED, EXTENDED RELEASE ORAL
Qty: 30 TABLET | Refills: 1 | OUTPATIENT
Start: 2020-12-04

## 2020-12-04 RX ORDER — ZOLPIDEM TARTRATE 10 MG/1
TABLET ORAL
Qty: 45 TABLET | Refills: 5 | Status: SHIPPED | OUTPATIENT
Start: 2020-12-04 | End: 2021-03-18 | Stop reason: SDUPTHER

## 2020-12-11 ENCOUNTER — ANESTHESIA EVENT (OUTPATIENT)
Dept: GASTROENTEROLOGY | Facility: HOSPITAL | Age: 60
End: 2020-12-11

## 2020-12-12 ENCOUNTER — HOSPITAL ENCOUNTER (OUTPATIENT)
Dept: GASTROENTEROLOGY | Facility: HOSPITAL | Age: 60
Setting detail: OUTPATIENT SURGERY
Discharge: HOME | End: 2020-12-12
Attending: INTERNAL MEDICINE | Admitting: INTERNAL MEDICINE
Payer: COMMERCIAL

## 2020-12-12 ENCOUNTER — ANESTHESIA (OUTPATIENT)
Dept: GASTROENTEROLOGY | Facility: HOSPITAL | Age: 60
End: 2020-12-12

## 2020-12-12 VITALS
RESPIRATION RATE: 19 BRPM | DIASTOLIC BLOOD PRESSURE: 97 MMHG | HEART RATE: 109 BPM | OXYGEN SATURATION: 97 % | HEIGHT: 60 IN | BODY MASS INDEX: 48.69 KG/M2 | SYSTOLIC BLOOD PRESSURE: 158 MMHG | TEMPERATURE: 98.6 F | WEIGHT: 248.02 LBS

## 2020-12-12 VITALS — HEART RATE: 102 BPM

## 2020-12-12 DIAGNOSIS — K44.9 HIATAL HERNIA: ICD-10-CM

## 2020-12-12 DIAGNOSIS — K21.9 GASTROESOPHAGEAL REFLUX DISEASE WITHOUT ESOPHAGITIS: ICD-10-CM

## 2020-12-12 PROBLEM — E66.01 MORBID OBESITY WITH BMI OF 45.0-49.9, ADULT (HCC): Chronic | Status: ACTIVE | Noted: 2020-12-12

## 2020-12-12 PROCEDURE — 88305 TISSUE EXAM BY PATHOLOGIST: CPT | Performed by: PATHOLOGY

## 2020-12-12 PROCEDURE — 43239 EGD BIOPSY SINGLE/MULTIPLE: CPT | Performed by: INTERNAL MEDICINE

## 2020-12-12 RX ORDER — KETAMINE HYDROCHLORIDE 50 MG/ML
INJECTION, SOLUTION, CONCENTRATE INTRAMUSCULAR; INTRAVENOUS AS NEEDED
Status: DISCONTINUED | OUTPATIENT
Start: 2020-12-12 | End: 2020-12-12

## 2020-12-12 RX ORDER — GLYCOPYRROLATE 0.2 MG/ML
INJECTION INTRAMUSCULAR; INTRAVENOUS AS NEEDED
Status: DISCONTINUED | OUTPATIENT
Start: 2020-12-12 | End: 2020-12-12

## 2020-12-12 RX ORDER — PROPOFOL 10 MG/ML
INJECTION, EMULSION INTRAVENOUS AS NEEDED
Status: DISCONTINUED | OUTPATIENT
Start: 2020-12-12 | End: 2020-12-12

## 2020-12-12 RX ORDER — LIDOCAINE HYDROCHLORIDE 20 MG/ML
INJECTION, SOLUTION EPIDURAL; INFILTRATION; INTRACAUDAL; PERINEURAL AS NEEDED
Status: DISCONTINUED | OUTPATIENT
Start: 2020-12-12 | End: 2020-12-12

## 2020-12-12 RX ORDER — SODIUM CHLORIDE, SODIUM LACTATE, POTASSIUM CHLORIDE, CALCIUM CHLORIDE 600; 310; 30; 20 MG/100ML; MG/100ML; MG/100ML; MG/100ML
75 INJECTION, SOLUTION INTRAVENOUS CONTINUOUS
Status: DISCONTINUED | OUTPATIENT
Start: 2020-12-12 | End: 2020-12-16 | Stop reason: HOSPADM

## 2020-12-12 RX ADMIN — PROPOFOL 30 MG: 10 INJECTION, EMULSION INTRAVENOUS at 07:47

## 2020-12-12 RX ADMIN — PROPOFOL 100 MG: 10 INJECTION, EMULSION INTRAVENOUS at 07:44

## 2020-12-12 RX ADMIN — LIDOCAINE HYDROCHLORIDE 100 MG: 20 INJECTION, SOLUTION EPIDURAL; INFILTRATION; INTRACAUDAL; PERINEURAL at 07:44

## 2020-12-12 RX ADMIN — GLYCOPYRROLATE 0.2 MG: 0.2 INJECTION, SOLUTION INTRAMUSCULAR; INTRAVENOUS at 07:44

## 2020-12-12 RX ADMIN — KETAMINE HYDROCHLORIDE 25 MG: 50 INJECTION, SOLUTION INTRAMUSCULAR; INTRAVENOUS at 07:44

## 2020-12-12 RX ADMIN — SODIUM CHLORIDE, SODIUM LACTATE, POTASSIUM CHLORIDE, AND CALCIUM CHLORIDE 75 ML/HR: .6; .31; .03; .02 INJECTION, SOLUTION INTRAVENOUS at 07:35

## 2020-12-30 ENCOUNTER — TELEPHONE (OUTPATIENT)
Dept: GASTROENTEROLOGY | Facility: CLINIC | Age: 60
End: 2020-12-30

## 2021-01-04 DIAGNOSIS — K21.9 GASTROESOPHAGEAL REFLUX DISEASE WITHOUT ESOPHAGITIS: ICD-10-CM

## 2021-01-04 RX ORDER — PANTOPRAZOLE SODIUM 40 MG/1
TABLET, DELAYED RELEASE ORAL
Qty: 30 TABLET | Refills: 1 | Status: SHIPPED | OUTPATIENT
Start: 2021-01-04 | End: 2021-02-23

## 2021-02-09 DIAGNOSIS — Z23 ENCOUNTER FOR IMMUNIZATION: ICD-10-CM

## 2021-02-13 ENCOUNTER — IMMUNIZATIONS (OUTPATIENT)
Dept: FAMILY MEDICINE CLINIC | Facility: HOSPITAL | Age: 61
End: 2021-02-13

## 2021-02-13 DIAGNOSIS — Z23 ENCOUNTER FOR IMMUNIZATION: Primary | ICD-10-CM

## 2021-02-13 PROCEDURE — 0011A SARS-COV-2 / COVID-19 MRNA VACCINE (MODERNA) 100 MCG: CPT

## 2021-02-13 PROCEDURE — 91301 SARS-COV-2 / COVID-19 MRNA VACCINE (MODERNA) 100 MCG: CPT

## 2021-02-17 ENCOUNTER — OFFICE VISIT (OUTPATIENT)
Dept: FAMILY MEDICINE CLINIC | Facility: CLINIC | Age: 61
End: 2021-02-17
Payer: COMMERCIAL

## 2021-02-17 VITALS
RESPIRATION RATE: 18 BRPM | HEART RATE: 96 BPM | TEMPERATURE: 97.3 F | WEIGHT: 243 LBS | DIASTOLIC BLOOD PRESSURE: 86 MMHG | HEIGHT: 60 IN | BODY MASS INDEX: 47.71 KG/M2 | SYSTOLIC BLOOD PRESSURE: 138 MMHG | OXYGEN SATURATION: 98 %

## 2021-02-17 DIAGNOSIS — Z01.818 PREOP EXAMINATION: Primary | ICD-10-CM

## 2021-02-17 PROCEDURE — 3008F BODY MASS INDEX DOCD: CPT | Performed by: NURSE PRACTITIONER

## 2021-02-17 PROCEDURE — 1036F TOBACCO NON-USER: CPT | Performed by: NURSE PRACTITIONER

## 2021-02-17 PROCEDURE — 99213 OFFICE O/P EST LOW 20 MIN: CPT | Performed by: NURSE PRACTITIONER

## 2021-02-17 RX ORDER — BRIMONIDINE TARTRATE/TIMOLOL 0.2%-0.5%
DROPS OPHTHALMIC (EYE)
COMMUNITY
Start: 2020-12-04 | End: 2021-08-24

## 2021-02-17 NOTE — PROGRESS NOTES
Assessment/Plan:           Problem List Items Addressed This Visit     None      Visit Diagnoses     Preop examination    -  Primary            Subjective:      Patient ID: Vaishali Mcgarry is a 61 y o  female  HPI    The following portions of the patient's history were reviewed and updated as appropriate: allergies, current medications, past family history, past medical history, past social history, past surgical history and problem list     Review of Systems   Constitutional: Negative for appetite change and fever  HENT: Negative for sinus pressure and sore throat  Eyes: Negative for pain  Respiratory: Negative for shortness of breath  Cardiovascular: Negative for chest pain  Gastrointestinal: Negative for abdominal pain  Genitourinary: Negative for dysuria  Musculoskeletal: Negative for arthralgias and myalgias  Skin: Negative for color change  Neurological: Negative for light-headedness  Psychiatric/Behavioral: Negative for behavioral problems  Objective:      /86 (BP Location: Left arm, Patient Position: Sitting, Cuff Size: Large)   Pulse 96   Temp (!) 97 3 °F (36 3 °C) (Temporal)   Resp 18   Ht 5' (1 524 m)   Wt 110 kg (243 lb)   SpO2 98%   BMI 47 46 kg/m²          Physical Exam  Vitals signs and nursing note reviewed  Constitutional:       General: She is not in acute distress  Appearance: She is well-developed  She is not diaphoretic  HENT:      Head: Normocephalic and atraumatic  Right Ear: External ear normal       Left Ear: External ear normal    Eyes:      Pupils: Pupils are equal, round, and reactive to light  Neck:      Musculoskeletal: Normal range of motion and neck supple  Cardiovascular:      Rate and Rhythm: Normal rate and regular rhythm  Pulses: Normal pulses  Heart sounds: Normal heart sounds  Pulmonary:      Effort: Pulmonary effort is normal       Breath sounds: Normal breath sounds     Abdominal:      Palpations: Abdomen is soft  Musculoskeletal: Normal range of motion  Skin:     General: Skin is dry  Neurological:      General: No focal deficit present  Mental Status: She is alert and oriented to person, place, and time  Psychiatric:         Behavior: Behavior normal          Thought Content:  Thought content normal

## 2021-02-23 DIAGNOSIS — K21.9 GASTROESOPHAGEAL REFLUX DISEASE WITHOUT ESOPHAGITIS: ICD-10-CM

## 2021-02-23 RX ORDER — PANTOPRAZOLE SODIUM 40 MG/1
TABLET, DELAYED RELEASE ORAL
Qty: 30 TABLET | Refills: 1 | Status: SHIPPED | OUTPATIENT
Start: 2021-02-23 | End: 2021-04-13

## 2021-03-13 ENCOUNTER — IMMUNIZATIONS (OUTPATIENT)
Dept: FAMILY MEDICINE CLINIC | Facility: HOSPITAL | Age: 61
End: 2021-03-13

## 2021-03-13 DIAGNOSIS — Z23 ENCOUNTER FOR IMMUNIZATION: Primary | ICD-10-CM

## 2021-03-13 PROCEDURE — 91301 SARS-COV-2 / COVID-19 MRNA VACCINE (MODERNA) 100 MCG: CPT

## 2021-03-13 PROCEDURE — 0012A SARS-COV-2 / COVID-19 MRNA VACCINE (MODERNA) 100 MCG: CPT

## 2021-03-18 DIAGNOSIS — G47.00 INSOMNIA, UNSPECIFIED TYPE: ICD-10-CM

## 2021-03-21 RX ORDER — ZOLPIDEM TARTRATE 10 MG/1
TABLET ORAL
Qty: 45 TABLET | Refills: 5 | Status: SHIPPED | OUTPATIENT
Start: 2021-03-21 | End: 2021-09-14

## 2021-04-13 DIAGNOSIS — K21.9 GASTROESOPHAGEAL REFLUX DISEASE WITHOUT ESOPHAGITIS: ICD-10-CM

## 2021-04-13 RX ORDER — PANTOPRAZOLE SODIUM 40 MG/1
TABLET, DELAYED RELEASE ORAL
Qty: 30 TABLET | Refills: 1 | Status: SHIPPED | OUTPATIENT
Start: 2021-04-13 | End: 2021-08-24

## 2021-07-21 ENCOUNTER — OFFICE VISIT (OUTPATIENT)
Dept: FAMILY MEDICINE CLINIC | Facility: CLINIC | Age: 61
End: 2021-07-21
Payer: COMMERCIAL

## 2021-07-21 VITALS
RESPIRATION RATE: 16 BRPM | HEIGHT: 60 IN | TEMPERATURE: 98.6 F | WEIGHT: 222 LBS | OXYGEN SATURATION: 97 % | DIASTOLIC BLOOD PRESSURE: 82 MMHG | BODY MASS INDEX: 43.59 KG/M2 | SYSTOLIC BLOOD PRESSURE: 132 MMHG | HEART RATE: 65 BPM

## 2021-07-21 DIAGNOSIS — F41.9 ANXIETY: ICD-10-CM

## 2021-07-21 DIAGNOSIS — F32.1 CURRENT MODERATE EPISODE OF MAJOR DEPRESSIVE DISORDER WITHOUT PRIOR EPISODE (HCC): ICD-10-CM

## 2021-07-21 DIAGNOSIS — R53.83 OTHER FATIGUE: ICD-10-CM

## 2021-07-21 DIAGNOSIS — Z11.59 NEED FOR HEPATITIS C SCREENING TEST: ICD-10-CM

## 2021-07-21 DIAGNOSIS — G47.00 INSOMNIA, UNSPECIFIED TYPE: ICD-10-CM

## 2021-07-21 DIAGNOSIS — Z12.31 SCREENING MAMMOGRAM FOR HIGH-RISK PATIENT: ICD-10-CM

## 2021-07-21 DIAGNOSIS — Z13.220 SCREENING CHOLESTEROL LEVEL: ICD-10-CM

## 2021-07-21 DIAGNOSIS — Z11.4 SCREENING FOR HIV (HUMAN IMMUNODEFICIENCY VIRUS): Primary | ICD-10-CM

## 2021-07-21 DIAGNOSIS — E66.01 MORBID OBESITY WITH BMI OF 45.0-49.9, ADULT (HCC): Chronic | ICD-10-CM

## 2021-07-21 DIAGNOSIS — Z23 IMMUNIZATION DUE: ICD-10-CM

## 2021-07-21 PROCEDURE — 90471 IMMUNIZATION ADMIN: CPT

## 2021-07-21 PROCEDURE — 90750 HZV VACC RECOMBINANT IM: CPT

## 2021-07-21 PROCEDURE — 1036F TOBACCO NON-USER: CPT | Performed by: FAMILY MEDICINE

## 2021-07-21 PROCEDURE — 3008F BODY MASS INDEX DOCD: CPT | Performed by: FAMILY MEDICINE

## 2021-07-21 PROCEDURE — 99213 OFFICE O/P EST LOW 20 MIN: CPT | Performed by: FAMILY MEDICINE

## 2021-07-21 RX ORDER — ZOSTER VACCINE RECOMBINANT, ADJUVANTED 50 MCG/0.5
0.5 KIT INTRAMUSCULAR ONCE
Qty: 1 EACH | Refills: 1 | Status: SHIPPED | OUTPATIENT
Start: 2021-07-21 | End: 2021-07-21

## 2021-07-21 RX ORDER — ALPRAZOLAM 0.5 MG/1
0.5 TABLET ORAL
Qty: 30 TABLET | Refills: 1 | Status: SHIPPED | OUTPATIENT
Start: 2021-07-21 | End: 2021-08-16

## 2021-07-21 NOTE — PROGRESS NOTES
BMI Counseling: There is no height or weight on file to calculate BMI  The BMI is above normal  Nutrition recommendations include decreasing portion sizes, encouraging healthy choices of fruits and vegetables, decreasing fast food intake, consuming healthier snacks, limiting drinks that contain sugar, moderation in carbohydrate intake, increasing intake of lean protein, reducing intake of saturated and trans fat and reducing intake of cholesterol  Exercise recommendations include exercising 3-5 times per week  No pharmacotherapy was ordered  Patient referred to PCP due to patient being overweight  Assessment/Plan:         Problem List Items Addressed This Visit        Other    Morbid obesity with BMI of 45 0-49 9, adult ([facility] [address] ) (Chronic)     Patient to increase exercise and partake of a diet with less calories to promote  weight loss         Insomnia - Primary     Discussed with patient use of sedative  medications and good  sleep hygiene to maximize treatment for this problem  Pt  to use sedatives only prior to sleep and cautioned them about usage at any other time  All patient questions about this problem answered today  Other Visit Diagnoses     Need for hepatitis C screening test        Screening for HIV (human immunodeficiency virus)                Subjective:      Patient ID: Ashu Pompa is a 61 y o  female  This is a 61-year-old white female here today for checkup on anxiety stress history of GERD and a BMI of 43  Patient with a boyfriend who recently was diagnosed with a rupture of his esophagus and was close to dying  He is doing better now and she also has a son who currently is being incarcerated as causing lot of stress and anxiety for also  Patient will start some Zoloft 50 mg for the stress and the panic and I will give her little Xanax for breakthrough anxiety when she needs it          The following portions of the patient's history were reviewed and updated as appropriate:   Past Medical History:  She has a past medical history of Asthma and GERD (gastroesophageal reflux disease)  ,  _______________________________________________________________________  Medical Problems:  does not have any pertinent problems on file ,  _______________________________________________________________________  Past Surgical History:   has no past surgical history on file ,  _______________________________________________________________________  Family History:  family history includes Bipolar disorder in her son; Breast cancer (age of onset: 76) in her mother; Heart disease in her mother and sister; No Known Problems in her daughter, maternal aunt, and sister; Stroke in her mother ,  _______________________________________________________________________  Social History:   reports that she quit smoking about 16 months ago  She has never used smokeless tobacco  She reports current alcohol use of about 1 0 standard drinks of alcohol per week  She reports that she does not use drugs  ,  _______________________________________________________________________  Allergies:  is allergic to augmentin [amoxicillin-pot clavulanate] and keflex [cephalexin]     _______________________________________________________________________  Current Outpatient Medications   Medication Sig Dispense Refill    Combigan 0 2-0 5 % INSTILL 1 DROP INTO LEFT EYE TWICE DAILY      pantoprazole (PROTONIX) 40 mg tablet TAKE 1 TABLET BY MOUTH DAILY 30 tablet 1    zolpidem (AMBIEN) 10 mg tablet 1 1/2 tabs po q hs prn 45 tablet 5     No current facility-administered medications for this visit      _______________________________________________________________________  Review of Systems   Constitutional: Negative for activity change, appetite change, fatigue and fever  HENT: Negative for congestion, ear pain, postnasal drip, rhinorrhea, sinus pressure, sinus pain, sneezing and sore throat  Eyes: Negative for pain and redness  Respiratory: Negative for apnea, cough, chest tightness, shortness of breath and wheezing  Cardiovascular: Negative for chest pain, palpitations and leg swelling  Gastrointestinal: Negative for abdominal pain, constipation, diarrhea, nausea and vomiting  Endocrine: Negative for cold intolerance and heat intolerance  Genitourinary: Negative for difficulty urinating, dysuria, frequency, hematuria and urgency  Musculoskeletal: Negative for arthralgias, back pain, gait problem and myalgias  Skin: Negative for rash  Neurological: Negative for dizziness, speech difficulty, weakness, numbness and headaches  Hematological: Does not bruise/bleed easily  Psychiatric/Behavioral: Negative for agitation, confusion and hallucinations  Objective: There were no vitals filed for this visit  There is no height or weight on file to calculate BMI  Physical Exam  Vitals and nursing note reviewed  Constitutional:       Appearance: She is well-developed  HENT:      Head: Normocephalic and atraumatic  Nose: Nose normal    Eyes:      General: No scleral icterus  Conjunctiva/sclera: Conjunctivae normal       Pupils: Pupils are equal, round, and reactive to light  Neck:      Thyroid: No thyromegaly  Cardiovascular:      Rate and Rhythm: Normal rate and regular rhythm  Pulmonary:      Effort: Pulmonary effort is normal       Breath sounds: Normal breath sounds  No wheezing  Abdominal:      General: Bowel sounds are normal  There is no distension  Palpations: Abdomen is soft  Tenderness: There is no abdominal tenderness  There is no guarding or rebound  Musculoskeletal:         General: No tenderness or deformity  Normal range of motion  Cervical back: Normal range of motion and neck supple  Skin:     General: Skin is warm and dry  Findings: No erythema or rash  Neurological:      Mental Status: She is alert and oriented to person, place, and time        Sensory: No sensory deficit  Psychiatric:         Behavior: Behavior normal          Thought Content:  Thought content normal          Judgment: Judgment normal

## 2021-08-10 ENCOUNTER — APPOINTMENT (OUTPATIENT)
Dept: LAB | Facility: MEDICAL CENTER | Age: 61
End: 2021-08-10
Payer: COMMERCIAL

## 2021-08-10 DIAGNOSIS — Z13.220 SCREENING CHOLESTEROL LEVEL: ICD-10-CM

## 2021-08-10 DIAGNOSIS — R53.83 OTHER FATIGUE: ICD-10-CM

## 2021-08-10 DIAGNOSIS — Z11.59 NEED FOR HEPATITIS C SCREENING TEST: ICD-10-CM

## 2021-08-10 DIAGNOSIS — Z11.4 SCREENING FOR HIV (HUMAN IMMUNODEFICIENCY VIRUS): ICD-10-CM

## 2021-08-10 LAB
ALBUMIN SERPL BCP-MCNC: 3.5 G/DL (ref 3.5–5)
ALP SERPL-CCNC: 64 U/L (ref 46–116)
ALT SERPL W P-5'-P-CCNC: 21 U/L (ref 12–78)
ANION GAP SERPL CALCULATED.3IONS-SCNC: 6 MMOL/L (ref 4–13)
AST SERPL W P-5'-P-CCNC: 11 U/L (ref 5–45)
BACTERIA UR QL AUTO: NORMAL /HPF
BASOPHILS # BLD AUTO: 0.01 THOUSANDS/ΜL (ref 0–0.1)
BASOPHILS NFR BLD AUTO: 0 % (ref 0–1)
BILIRUB SERPL-MCNC: 0.43 MG/DL (ref 0.2–1)
BILIRUB UR QL STRIP: NEGATIVE
BUN SERPL-MCNC: 13 MG/DL (ref 5–25)
CALCIUM SERPL-MCNC: 9.8 MG/DL (ref 8.3–10.1)
CHLORIDE SERPL-SCNC: 112 MMOL/L (ref 100–108)
CHOLEST SERPL-MCNC: 259 MG/DL (ref 50–200)
CLARITY UR: CLEAR
CO2 SERPL-SCNC: 27 MMOL/L (ref 21–32)
COLOR UR: YELLOW
CREAT SERPL-MCNC: 0.89 MG/DL (ref 0.6–1.3)
EOSINOPHIL # BLD AUTO: 0.1 THOUSAND/ΜL (ref 0–0.61)
EOSINOPHIL NFR BLD AUTO: 2 % (ref 0–6)
ERYTHROCYTE [DISTWIDTH] IN BLOOD BY AUTOMATED COUNT: 13.6 % (ref 11.6–15.1)
GFR SERPL CREATININE-BSD FRML MDRD: 71 ML/MIN/1.73SQ M
GLUCOSE P FAST SERPL-MCNC: 85 MG/DL (ref 65–99)
GLUCOSE UR STRIP-MCNC: NEGATIVE MG/DL
HCT VFR BLD AUTO: 43.8 % (ref 34.8–46.1)
HCV AB SER QL: NORMAL
HDLC SERPL-MCNC: 67 MG/DL
HGB BLD-MCNC: 13.9 G/DL (ref 11.5–15.4)
HGB UR QL STRIP.AUTO: NEGATIVE
HYALINE CASTS #/AREA URNS LPF: NORMAL /LPF
IMM GRANULOCYTES # BLD AUTO: 0.01 THOUSAND/UL (ref 0–0.2)
IMM GRANULOCYTES NFR BLD AUTO: 0 % (ref 0–2)
KETONES UR STRIP-MCNC: NEGATIVE MG/DL
LDLC SERPL CALC-MCNC: 166 MG/DL (ref 0–100)
LEUKOCYTE ESTERASE UR QL STRIP: NEGATIVE
LYMPHOCYTES # BLD AUTO: 1.46 THOUSANDS/ΜL (ref 0.6–4.47)
LYMPHOCYTES NFR BLD AUTO: 28 % (ref 14–44)
MAGNESIUM SERPL-MCNC: 2.5 MG/DL (ref 1.6–2.6)
MCH RBC QN AUTO: 30.1 PG (ref 26.8–34.3)
MCHC RBC AUTO-ENTMCNC: 31.7 G/DL (ref 31.4–37.4)
MCV RBC AUTO: 95 FL (ref 82–98)
MONOCYTES # BLD AUTO: 0.28 THOUSAND/ΜL (ref 0.17–1.22)
MONOCYTES NFR BLD AUTO: 5 % (ref 4–12)
NEUTROPHILS # BLD AUTO: 3.3 THOUSANDS/ΜL (ref 1.85–7.62)
NEUTS SEG NFR BLD AUTO: 65 % (ref 43–75)
NITRITE UR QL STRIP: NEGATIVE
NON-SQ EPI CELLS URNS QL MICRO: NORMAL /HPF
NRBC BLD AUTO-RTO: 0 /100 WBCS
PH UR STRIP.AUTO: 6 [PH]
PLATELET # BLD AUTO: 292 THOUSANDS/UL (ref 149–390)
PMV BLD AUTO: 10.3 FL (ref 8.9–12.7)
POTASSIUM SERPL-SCNC: 4.3 MMOL/L (ref 3.5–5.3)
PROT SERPL-MCNC: 7.3 G/DL (ref 6.4–8.2)
PROT UR STRIP-MCNC: NEGATIVE MG/DL
RBC # BLD AUTO: 4.62 MILLION/UL (ref 3.81–5.12)
RBC #/AREA URNS AUTO: NORMAL /HPF
SODIUM SERPL-SCNC: 145 MMOL/L (ref 136–145)
SP GR UR STRIP.AUTO: 1.01 (ref 1–1.03)
T4 FREE SERPL-MCNC: 0.8 NG/DL (ref 0.76–1.46)
TRIGL SERPL-MCNC: 128 MG/DL
TSH SERPL DL<=0.05 MIU/L-ACNC: 1.21 UIU/ML (ref 0.36–3.74)
URATE SERPL-MCNC: 6.2 MG/DL (ref 2–6.8)
UROBILINOGEN UR QL STRIP.AUTO: 0.2 E.U./DL
WBC # BLD AUTO: 5.16 THOUSAND/UL (ref 4.31–10.16)
WBC #/AREA URNS AUTO: NORMAL /HPF

## 2021-08-10 PROCEDURE — 84439 ASSAY OF FREE THYROXINE: CPT

## 2021-08-10 PROCEDURE — 87389 HIV-1 AG W/HIV-1&-2 AB AG IA: CPT

## 2021-08-10 PROCEDURE — 84443 ASSAY THYROID STIM HORMONE: CPT

## 2021-08-10 PROCEDURE — 80053 COMPREHEN METABOLIC PANEL: CPT

## 2021-08-10 PROCEDURE — 85025 COMPLETE CBC W/AUTO DIFF WBC: CPT

## 2021-08-10 PROCEDURE — 86803 HEPATITIS C AB TEST: CPT

## 2021-08-10 PROCEDURE — 36415 COLL VENOUS BLD VENIPUNCTURE: CPT

## 2021-08-10 PROCEDURE — 83735 ASSAY OF MAGNESIUM: CPT

## 2021-08-10 PROCEDURE — 84550 ASSAY OF BLOOD/URIC ACID: CPT

## 2021-08-10 PROCEDURE — 80061 LIPID PANEL: CPT

## 2021-08-10 PROCEDURE — 81001 URINALYSIS AUTO W/SCOPE: CPT | Performed by: FAMILY MEDICINE

## 2021-08-11 LAB — HIV 1+2 AB+HIV1 P24 AG SERPL QL IA: NORMAL

## 2021-08-16 DIAGNOSIS — F41.9 ANXIETY: ICD-10-CM

## 2021-08-16 RX ORDER — ALPRAZOLAM 0.5 MG/1
0.5 TABLET ORAL
Qty: 30 TABLET | Refills: 3 | Status: SHIPPED | OUTPATIENT
Start: 2021-08-16 | End: 2021-08-25

## 2021-08-16 NOTE — TELEPHONE ENCOUNTER
Requested medication(s) are due for refill today: Yes  Patient has already received a courtesy refill: No  Other reason request has been forwarded to provider: comtrolled substance cannot be delegated

## 2021-08-24 PROBLEM — F41.9 ANXIETY: Status: ACTIVE | Noted: 2021-08-24

## 2021-08-24 PROBLEM — F32.1 CURRENT MODERATE EPISODE OF MAJOR DEPRESSIVE DISORDER WITHOUT PRIOR EPISODE (HCC): Status: ACTIVE | Noted: 2021-08-24

## 2021-08-25 ENCOUNTER — OFFICE VISIT (OUTPATIENT)
Dept: FAMILY MEDICINE CLINIC | Facility: CLINIC | Age: 61
End: 2021-08-25
Payer: COMMERCIAL

## 2021-08-25 VITALS
HEART RATE: 84 BPM | WEIGHT: 221 LBS | SYSTOLIC BLOOD PRESSURE: 132 MMHG | OXYGEN SATURATION: 98 % | TEMPERATURE: 98.6 F | BODY MASS INDEX: 43.39 KG/M2 | RESPIRATION RATE: 18 BRPM | DIASTOLIC BLOOD PRESSURE: 84 MMHG | HEIGHT: 60 IN

## 2021-08-25 DIAGNOSIS — F32.1 CURRENT MODERATE EPISODE OF MAJOR DEPRESSIVE DISORDER WITHOUT PRIOR EPISODE (HCC): Primary | ICD-10-CM

## 2021-08-25 DIAGNOSIS — E78.2 MIXED HYPERLIPIDEMIA: ICD-10-CM

## 2021-08-25 DIAGNOSIS — G47.00 INSOMNIA, UNSPECIFIED TYPE: ICD-10-CM

## 2021-08-25 DIAGNOSIS — K21.9 GASTROESOPHAGEAL REFLUX DISEASE WITHOUT ESOPHAGITIS: ICD-10-CM

## 2021-08-25 DIAGNOSIS — E66.01 MORBID OBESITY WITH BMI OF 45.0-49.9, ADULT (HCC): Chronic | ICD-10-CM

## 2021-08-25 DIAGNOSIS — F41.9 ANXIETY: ICD-10-CM

## 2021-08-25 DIAGNOSIS — F32.A DEPRESSION, UNSPECIFIED DEPRESSION TYPE: ICD-10-CM

## 2021-08-25 DIAGNOSIS — Z13.220 SCREENING CHOLESTEROL LEVEL: ICD-10-CM

## 2021-08-25 PROCEDURE — 99214 OFFICE O/P EST MOD 30 MIN: CPT | Performed by: FAMILY MEDICINE

## 2021-08-25 RX ORDER — MIRTAZAPINE 15 MG/1
15 TABLET, FILM COATED ORAL
Qty: 30 TABLET | Refills: 1 | Status: SHIPPED | OUTPATIENT
Start: 2021-08-25 | End: 2021-10-27

## 2021-08-25 RX ORDER — ALPRAZOLAM 1 MG/1
1 TABLET ORAL
Qty: 30 TABLET | Refills: 3 | Status: SHIPPED | OUTPATIENT
Start: 2021-08-25 | End: 2021-10-06

## 2021-08-25 NOTE — PROGRESS NOTES
Depression Screening and Follow-up Plan: Patient's depression screening was positive with a PHQ-2 score of 2  Their PHQ-9 score was 2  Patient assessed for underlying major depression  Brief counseling provided and recommend additional follow-up/re-evaluation next office visit  Patient advised to follow-up with PCP for further management  Assessment/Plan:         Problem List Items Addressed This Visit        Digestive    GERD (gastroesophageal reflux disease)     Patient to continue with present therapy and decrease caffeine, avoid ETOH and smoking to decrease acid production  Pt should also cease eating prior to bedtime and avoid excessive fluid intake prior to sleep  May use antacids as needed for breakthrough GERD  All pateint questions answered today about this condition  Other    Morbid obesity with BMI of 45 0-49 9, adult ([facility] [address] ) (Chronic)     Patient to increase exercise and partake of a diet with less calories to promote  weight loss         Insomnia     Discussed with patient use of sedative  medications and good  sleep hygiene to maximize treatment for this problem  Pt  to use sedatives only prior to sleep and cautioned them about usage at any other time  All patient questions about this problem answered today  Current moderate episode of major depressive disorder without prior episode [facility]) - Primary    Anxiety            Subjective:      Patient ID: Margie Willis is a 61 y o  female  This 66-year-old white female here today for checkup on GERD BMI of 43 history of asthma as well as anxiety with stress  Patient states Zoloft timely helping a whole lot and she has been having a lot of problems with insomnia and sleeping at night taking lot of Ambien Sominex type medication and Ativan  Patient will need to change from Zoloft to Remeron to see if she can help with that problem  Also increase her is a angiolytic to 1 mg a day    Patient's with high cholesterol will give her formation low-cholesterol diet however that problem can wait till further in the future for she is not mother at problems with the motions and anxiety going on at this point would be addressed 1st       The following portions of the patient's history were reviewed and updated as appropriate:   Past Medical History:  She has a past medical history of Asthma, GERD (gastroesophageal reflux disease), and Visual impairment (2019)  ,  _______________________________________________________________________  Medical Problems:  does not have any pertinent problems on file ,  _______________________________________________________________________  Past Surgical History:   has a past surgical history that includes Eye surgery (2020) and Tubal ligation (1993)  ,  _______________________________________________________________________  Family History:  family history includes Arthritis in her father and mother; Bipolar disorder in her son; Breast cancer (age of onset: 76) in her mother; Cancer in her mother; Dementia in her father and mother; Depression in her mother; Heart disease in her mother and sister; No Known Problems in her daughter, maternal aunt, and sister; Stroke in her mother ,  _______________________________________________________________________  Social History:   reports that she quit smoking about 16 years ago  Her smoking use included cigarettes  She started smoking about 21 years ago  She smoked 0 00 packs per day for 5 00 years  She has never used smokeless tobacco  She reports previous alcohol use of about 3 0 standard drinks of alcohol per week  She reports that she does not use drugs  ,  _______________________________________________________________________  Allergies:  is allergic to augmentin [amoxicillin-pot clavulanate] and keflex [cephalexin]     _______________________________________________________________________  Current Outpatient Medications   Medication Sig Dispense Refill    ALPRAZolam (XANAX) 0 5 mg tablet TAKE 1 TABLET (0 5 MG TOTAL) BY MOUTH DAILY AT BEDTIME AS NEEDED FOR ANXIETY 30 tablet 3    sertraline (ZOLOFT) 50 mg tablet Take 1 tablet (50 mg total) by mouth daily 30 tablet 5    zolpidem (AMBIEN) 10 mg tablet 1 1/2 tabs po q hs prn 45 tablet 5     No current facility-administered medications for this visit      _______________________________________________________________________  Review of Systems      Objective: There were no vitals filed for this visit  There is no height or weight on file to calculate BMI       Physical Exam

## 2021-09-14 DIAGNOSIS — G47.00 INSOMNIA, UNSPECIFIED TYPE: ICD-10-CM

## 2021-09-14 RX ORDER — ZOLPIDEM TARTRATE 10 MG/1
TABLET ORAL
Qty: 45 TABLET | Refills: 1 | Status: SHIPPED | OUTPATIENT
Start: 2021-09-14 | End: 2021-10-06

## 2021-10-06 ENCOUNTER — CONSULT (OUTPATIENT)
Dept: FAMILY MEDICINE CLINIC | Facility: CLINIC | Age: 61
End: 2021-10-06
Payer: COMMERCIAL

## 2021-10-06 VITALS
WEIGHT: 231 LBS | HEIGHT: 62 IN | OXYGEN SATURATION: 98 % | RESPIRATION RATE: 20 BRPM | SYSTOLIC BLOOD PRESSURE: 134 MMHG | HEART RATE: 84 BPM | TEMPERATURE: 98 F | BODY MASS INDEX: 42.51 KG/M2 | DIASTOLIC BLOOD PRESSURE: 74 MMHG

## 2021-10-06 DIAGNOSIS — G47.00 INSOMNIA, UNSPECIFIED TYPE: ICD-10-CM

## 2021-10-06 DIAGNOSIS — Z01.818 PRE-OPERATIVE CLEARANCE: ICD-10-CM

## 2021-10-06 DIAGNOSIS — Z23 ENCOUNTER FOR IMMUNIZATION: ICD-10-CM

## 2021-10-06 DIAGNOSIS — E66.01 MORBID OBESITY WITH BMI OF 45.0-49.9, ADULT (HCC): Chronic | ICD-10-CM

## 2021-10-06 DIAGNOSIS — H25.9 AGE-RELATED CATARACT OF BOTH EYES, UNSPECIFIED AGE-RELATED CATARACT TYPE: ICD-10-CM

## 2021-10-06 DIAGNOSIS — Z23 IMMUNIZATION DUE: ICD-10-CM

## 2021-10-06 DIAGNOSIS — F41.9 ANXIETY: ICD-10-CM

## 2021-10-06 DIAGNOSIS — E78.2 MIXED HYPERLIPIDEMIA: ICD-10-CM

## 2021-10-06 DIAGNOSIS — Z00.00 WELL ADULT EXAM: Primary | ICD-10-CM

## 2021-10-06 PROCEDURE — 90750 HZV VACC RECOMBINANT IM: CPT | Performed by: FAMILY MEDICINE

## 2021-10-06 PROCEDURE — 90682 RIV4 VACC RECOMBINANT DNA IM: CPT | Performed by: FAMILY MEDICINE

## 2021-10-06 PROCEDURE — 99396 PREV VISIT EST AGE 40-64: CPT | Performed by: FAMILY MEDICINE

## 2021-10-06 PROCEDURE — 90472 IMMUNIZATION ADMIN EACH ADD: CPT | Performed by: FAMILY MEDICINE

## 2021-10-06 PROCEDURE — 90471 IMMUNIZATION ADMIN: CPT | Performed by: FAMILY MEDICINE

## 2021-10-06 RX ORDER — ALPRAZOLAM 1 MG/1
2 TABLET ORAL
Qty: 30 TABLET | Refills: 3 | Status: SHIPPED | OUTPATIENT
Start: 2021-10-06 | End: 2022-02-07

## 2021-10-06 RX ORDER — ZOLPIDEM TARTRATE 10 MG/1
TABLET ORAL
Qty: 45 TABLET | Refills: 1 | Status: SHIPPED | OUTPATIENT
Start: 2021-10-06 | End: 2021-12-11

## 2021-10-27 DIAGNOSIS — F32.A DEPRESSION, UNSPECIFIED DEPRESSION TYPE: ICD-10-CM

## 2021-10-27 RX ORDER — MIRTAZAPINE 15 MG/1
15 TABLET, FILM COATED ORAL
Qty: 30 TABLET | Refills: 1 | Status: SHIPPED | OUTPATIENT
Start: 2021-10-27 | End: 2022-01-12

## 2021-12-10 ENCOUNTER — TELEPHONE (OUTPATIENT)
Dept: FAMILY MEDICINE CLINIC | Facility: CLINIC | Age: 61
End: 2021-12-10

## 2021-12-10 DIAGNOSIS — G47.00 INSOMNIA, UNSPECIFIED TYPE: ICD-10-CM

## 2021-12-11 RX ORDER — ZOLPIDEM TARTRATE 10 MG/1
TABLET ORAL
Qty: 45 TABLET | Refills: 0 | Status: SHIPPED | OUTPATIENT
Start: 2021-12-11 | End: 2022-02-23 | Stop reason: SDUPTHER

## 2022-01-01 DIAGNOSIS — Z98.890 S/P D&C (STATUS POST DILATION AND CURETTAGE): ICD-10-CM

## 2022-01-01 RX ORDER — IBUPROFEN 600 MG/1
600 TABLET ORAL EVERY 6 HOURS PRN
Qty: 30 TABLET | Refills: 0 | Status: SHIPPED | OUTPATIENT
Start: 2022-01-01 | End: 2023-01-01

## 2022-01-01 RX ORDER — SENNOSIDES 8.6 MG
650 CAPSULE ORAL EVERY 6 HOURS PRN
Qty: 30 TABLET | Refills: 0 | Status: SHIPPED | OUTPATIENT
Start: 2022-01-01 | End: 2023-07-25 | Stop reason: CLARIF

## 2022-01-12 DIAGNOSIS — F32.A DEPRESSION, UNSPECIFIED DEPRESSION TYPE: ICD-10-CM

## 2022-01-12 RX ORDER — MIRTAZAPINE 15 MG/1
15 TABLET, FILM COATED ORAL
Qty: 30 TABLET | Refills: 1 | Status: SHIPPED | OUTPATIENT
Start: 2022-01-12 | End: 2022-03-23

## 2022-01-12 RX ORDER — MIRTAZAPINE 15 MG/1
15 TABLET, FILM COATED ORAL
Qty: 30 TABLET | Refills: 1 | Status: SHIPPED | OUTPATIENT
Start: 2022-01-12

## 2022-01-13 DIAGNOSIS — F32.A DEPRESSION, UNSPECIFIED DEPRESSION TYPE: ICD-10-CM

## 2022-01-13 RX ORDER — MIRTAZAPINE 15 MG/1
15 TABLET, FILM COATED ORAL
Qty: 30 TABLET | Refills: 0 | OUTPATIENT
Start: 2022-01-13

## 2022-01-18 NOTE — PROGRESS NOTES
BMI Counseling: There is no height or weight on file to calculate BMI  The BMI is above normal  Nutrition recommendations include decreasing portion sizes, encouraging healthy choices of fruits and vegetables, decreasing fast food intake, consuming healthier snacks, limiting drinks that contain sugar, moderation in carbohydrate intake, increasing intake of lean protein, reducing intake of saturated and trans fat and reducing intake of cholesterol  Exercise recommendations include exercising 3-5 times per week  No pharmacotherapy was ordered  Patient referred to PCP  Rationale for BMI follow-up plan is due to patient being overweight or obese  Depression Screening and Follow-up Plan: Patient assessed for underlying major depression  Brief counseling provided and recommend additional follow-up/re-evaluation next office visit  Patient advised to follow-up with PCP for further management  Assessment/Plan:         Problem List Items Addressed This Visit        Digestive    GERD (gastroesophageal reflux disease) - Primary     Patient to continue with present therapy and decrease caffeine, avoid ETOH and smoking to decrease acid production  Pt should also cease eating prior to bedtime and avoid excessive fluid intake prior to sleep  May use antacids as needed for breakthrough GERD  All pateint questions answered today about this condition  Other    Morbid obesity with BMI of 45 0-49 9, adult ([address] ) (Chronic)     Patient to increase exercise and partake of a diet with less calories to promote  weight loss         Insomnia     Discussed with patient use of sedative  medications and good  sleep hygiene to maximize treatment for this problem  Pt  to use sedatives only prior to sleep and cautioned them about usage at any other time  All patient questions about this problem answered today           Current moderate episode of major depressive disorder without prior episode [facility])     Patient to continue utilizing medical therapy as well and counseling sources as applicable for condition  If  suicidal thought or fear of suicide to contact 911 and seek help immediately  Meds reviewed and patient questions answered today         Anxiety     Patient to continue utilizing medical therapy as well as counseling sources as applicable to condition  If suicidal thought or fear of suicide attempt, to call 911 and seek help immediately  Medications and therapy reviewed today and all patient  questions answered today  Mixed hyperlipidemia     Patient  is stable with current medication and we discussed a low fat low cholesterol diet  Weight loss also discussed for this will help lower cholesterol also  Recheck lipids in 6 months  Subjective:      Patient ID: Louisa Telles is a 64 y o  female  This 72-year-old female here today for checkup on multiple medical problems  Patient with insomnia some depression some anxiety as well as a BMI of 47  Patient will also be getting a mammogram and she of an ultrasound ordered in case she needs it  Patient still with lots of problems sleeping at night and I will see about adding some trazodone 150 mg at bedtime to see if we can get her off the Ambien which was not work so well for her  Patient still uses Xanax at night to sleep as well as taking her Remeron  Patient also has high cholesterol and she is given a slip for lab work to see how she is doing with her red rice yeast to which she has been taking over-the-counter        The following portions of the patient's history were reviewed and updated as appropriate:   Past Medical History:  She has a past medical history of Asthma, GERD (gastroesophageal reflux disease), Mixed hyperlipidemia (8/25/2021), and Visual impairment (2019)  ,  _______________________________________________________________________  Medical Problems:  does not have any pertinent problems on file ,  _______________________________________________________________________  Past Surgical History:   has a past surgical history that includes Eye surgery (2020) and Tubal ligation (1993)  ,  _______________________________________________________________________  Family History:  family history includes Arthritis in her father and mother; Bipolar disorder in her son; Breast cancer (age of onset: 76) in her mother; Cancer in her mother; Dementia in her father and mother; Depression in her mother; Heart disease in her mother and sister; No Known Problems in her daughter, maternal aunt, and sister; Stroke in her mother ,  _______________________________________________________________________  Social History:   reports that she quit smoking about 17 years ago  Her smoking use included cigarettes  She started smoking about 21 years ago  She smoked 0 00 packs per day for 5 00 years  She has never used smokeless tobacco  She reports previous alcohol use of about 3 0 standard drinks of alcohol per week  She reports that she does not use drugs  ,  _______________________________________________________________________  Allergies:  is allergic to augmentin [amoxicillin-pot clavulanate] and keflex [cephalexin]     _______________________________________________________________________  Current Outpatient Medications   Medication Sig Dispense Refill    ALPRAZolam (XANAX) 1 mg tablet Take 2 tablets (2 mg total) by mouth daily at bedtime as needed for anxiety 30 tablet 3    mirtazapine (REMERON) 15 mg tablet TAKE 1 TABLET (15 MG TOTAL) BY MOUTH DAILY AT BEDTIME 30 tablet 1    mirtazapine (REMERON) 15 mg tablet TAKE 1 TABLET (15 MG TOTAL) BY MOUTH DAILY AT BEDTIME 30 tablet 1    zolpidem (AMBIEN) 10 mg tablet 1 1/2 TABLETS BY MOUTH AT BEDTIME AS NEEDED 45 tablet 0     No current facility-administered medications for this visit      _______________________________________________________________________  Review of Systems Constitutional: Negative for activity change, appetite change, fatigue and fever  HENT: Negative for congestion, ear pain, postnasal drip, rhinorrhea, sinus pressure, sinus pain, sneezing and sore throat  Eyes: Negative for pain and redness  Respiratory: Negative for apnea, cough, chest tightness, shortness of breath and wheezing  Cardiovascular: Negative for chest pain, palpitations and leg swelling  Gastrointestinal: Negative for abdominal pain, constipation, diarrhea, nausea and vomiting  Endocrine: Negative for cold intolerance and heat intolerance  Genitourinary: Negative for difficulty urinating, dysuria, frequency, hematuria and urgency  Musculoskeletal: Negative for arthralgias, back pain, gait problem and myalgias  Skin: Negative for rash  Neurological: Negative for dizziness, speech difficulty, weakness, numbness and headaches  Hematological: Does not bruise/bleed easily  Psychiatric/Behavioral: Negative for agitation, confusion and hallucinations  Objective: There were no vitals filed for this visit  There is no height or weight on file to calculate BMI  Physical Exam  Vitals and nursing note reviewed  Constitutional:       Appearance: She is well-developed  HENT:      Head: Normocephalic and atraumatic  Nose: Nose normal    Eyes:      General: No scleral icterus  Conjunctiva/sclera: Conjunctivae normal       Pupils: Pupils are equal, round, and reactive to light  Neck:      Thyroid: No thyromegaly  Cardiovascular:      Rate and Rhythm: Normal rate and regular rhythm  Pulmonary:      Effort: Pulmonary effort is normal       Breath sounds: Normal breath sounds  No wheezing  Abdominal:      General: Bowel sounds are normal  There is no distension  Palpations: Abdomen is soft  Tenderness: There is no abdominal tenderness  There is no guarding or rebound  Musculoskeletal:         General: No tenderness or deformity   Normal range of motion  Cervical back: Normal range of motion and neck supple  Right lower leg: No edema  Left lower leg: No edema  Skin:     General: Skin is warm and dry  Findings: No erythema or rash  Neurological:      Mental Status: She is alert and oriented to person, place, and time  Sensory: No sensory deficit  Psychiatric:         Mood and Affect: Mood normal          Behavior: Behavior normal          Thought Content:  Thought content normal          Judgment: Judgment normal

## 2022-01-19 ENCOUNTER — OFFICE VISIT (OUTPATIENT)
Dept: FAMILY MEDICINE CLINIC | Facility: CLINIC | Age: 62
End: 2022-01-19
Payer: COMMERCIAL

## 2022-01-19 VITALS
BODY MASS INDEX: 47.29 KG/M2 | OXYGEN SATURATION: 97 % | HEART RATE: 93 BPM | WEIGHT: 257 LBS | RESPIRATION RATE: 20 BRPM | HEIGHT: 62 IN | DIASTOLIC BLOOD PRESSURE: 74 MMHG | TEMPERATURE: 98.1 F | SYSTOLIC BLOOD PRESSURE: 132 MMHG

## 2022-01-19 DIAGNOSIS — E66.01 MORBID OBESITY WITH BMI OF 45.0-49.9, ADULT (HCC): Chronic | ICD-10-CM

## 2022-01-19 DIAGNOSIS — F32.1 CURRENT MODERATE EPISODE OF MAJOR DEPRESSIVE DISORDER WITHOUT PRIOR EPISODE (HCC): ICD-10-CM

## 2022-01-19 DIAGNOSIS — F41.9 ANXIETY: ICD-10-CM

## 2022-01-19 DIAGNOSIS — E78.2 MIXED HYPERLIPIDEMIA: ICD-10-CM

## 2022-01-19 DIAGNOSIS — Z12.31 ENCOUNTER FOR SCREENING MAMMOGRAM FOR BREAST CANCER: ICD-10-CM

## 2022-01-19 DIAGNOSIS — G47.00 INSOMNIA, UNSPECIFIED TYPE: ICD-10-CM

## 2022-01-19 DIAGNOSIS — K21.9 GASTROESOPHAGEAL REFLUX DISEASE WITHOUT ESOPHAGITIS: Primary | ICD-10-CM

## 2022-01-19 DIAGNOSIS — Z23 ENCOUNTER FOR IMMUNIZATION: ICD-10-CM

## 2022-01-19 PROCEDURE — 99214 OFFICE O/P EST MOD 30 MIN: CPT | Performed by: FAMILY MEDICINE

## 2022-01-19 RX ORDER — TRAZODONE HYDROCHLORIDE 150 MG/1
150 TABLET ORAL
Qty: 30 TABLET | Refills: 1 | Status: SHIPPED | OUTPATIENT
Start: 2022-01-19 | End: 2022-02-10

## 2022-01-27 ENCOUNTER — HOSPITAL ENCOUNTER (OUTPATIENT)
Dept: MAMMOGRAPHY | Facility: CLINIC | Age: 62
Discharge: HOME | End: 2022-01-27
Payer: COMMERCIAL

## 2022-01-27 ENCOUNTER — HOSPITAL ENCOUNTER (OUTPATIENT)
Dept: ULTRASOUND IMAGING | Facility: CLINIC | Age: 62
Discharge: HOME | End: 2022-01-27
Payer: COMMERCIAL

## 2022-01-27 VITALS — BODY MASS INDEX: 47.29 KG/M2 | WEIGHT: 257 LBS | HEIGHT: 62 IN

## 2022-01-27 DIAGNOSIS — R92.8 ABNORMAL MAMMOGRAM OF LEFT BREAST: ICD-10-CM

## 2022-01-27 DIAGNOSIS — R92.8 ABNORMAL FINDINGS ON DIAGNOSTIC IMAGING OF BREAST: ICD-10-CM

## 2022-01-27 PROCEDURE — 76642 ULTRASOUND BREAST LIMITED: CPT

## 2022-01-27 PROCEDURE — 77066 DX MAMMO INCL CAD BI: CPT

## 2022-01-27 PROCEDURE — G0279 TOMOSYNTHESIS, MAMMO: HCPCS

## 2022-02-05 DIAGNOSIS — F41.9 ANXIETY: ICD-10-CM

## 2022-02-07 RX ORDER — ALPRAZOLAM 1 MG/1
2 TABLET ORAL
Qty: 30 TABLET | Refills: 2 | Status: SHIPPED | OUTPATIENT
Start: 2022-02-07 | End: 2022-05-26

## 2022-02-10 DIAGNOSIS — G47.00 INSOMNIA, UNSPECIFIED TYPE: ICD-10-CM

## 2022-02-10 RX ORDER — TRAZODONE HYDROCHLORIDE 150 MG/1
150 TABLET ORAL
Qty: 30 TABLET | Refills: 1 | Status: SHIPPED | OUTPATIENT
Start: 2022-02-10 | End: 2022-05-05

## 2022-02-23 ENCOUNTER — OFFICE VISIT (OUTPATIENT)
Dept: FAMILY MEDICINE CLINIC | Facility: CLINIC | Age: 62
End: 2022-02-23
Payer: COMMERCIAL

## 2022-02-23 VITALS
SYSTOLIC BLOOD PRESSURE: 132 MMHG | BODY MASS INDEX: 46.01 KG/M2 | RESPIRATION RATE: 20 BRPM | TEMPERATURE: 98.1 F | HEART RATE: 85 BPM | DIASTOLIC BLOOD PRESSURE: 78 MMHG | HEIGHT: 62 IN | WEIGHT: 250 LBS | OXYGEN SATURATION: 97 %

## 2022-02-23 DIAGNOSIS — E78.49 OTHER HYPERLIPIDEMIA: ICD-10-CM

## 2022-02-23 DIAGNOSIS — F32.1 CURRENT MODERATE EPISODE OF MAJOR DEPRESSIVE DISORDER WITHOUT PRIOR EPISODE (HCC): ICD-10-CM

## 2022-02-23 DIAGNOSIS — G47.00 INSOMNIA, UNSPECIFIED TYPE: ICD-10-CM

## 2022-02-23 DIAGNOSIS — F41.9 ANXIETY: ICD-10-CM

## 2022-02-23 DIAGNOSIS — K21.9 GASTROESOPHAGEAL REFLUX DISEASE WITHOUT ESOPHAGITIS: Primary | ICD-10-CM

## 2022-02-23 DIAGNOSIS — R53.83 OTHER FATIGUE: ICD-10-CM

## 2022-02-23 DIAGNOSIS — Z23 ENCOUNTER FOR IMMUNIZATION: ICD-10-CM

## 2022-02-23 PROCEDURE — 99214 OFFICE O/P EST MOD 30 MIN: CPT | Performed by: FAMILY MEDICINE

## 2022-02-23 RX ORDER — ZOLPIDEM TARTRATE 10 MG/1
TABLET ORAL
Qty: 135 TABLET | Refills: 1 | Status: SHIPPED | OUTPATIENT
Start: 2022-02-23

## 2022-02-23 NOTE — PROGRESS NOTES
Depression Screening and Follow-up Plan: Patient assessed for underlying major depression  Brief counseling provided and recommend additional follow-up/re-evaluation next office visit  Patient advised to follow-up with PCP for further management  Assessment/Plan:         Problem List Items Addressed This Visit        Digestive    GERD (gastroesophageal reflux disease) - Primary     Patient to continue with present therapy and decrease caffeine, avoid ETOH and smoking to decrease acid production  Pt should also cease eating prior to bedtime and avoid excessive fluid intake prior to sleep  May use antacids as needed for breakthrough GERD  All pateint questions answered today about this condition  Other    Insomnia     Discussed with patient use of sedative  medications and good  sleep hygiene to maximize treatment for this problem  Pt  to use sedatives only prior to sleep and cautioned them about usage at any other time  All patient questions about this problem answered today  Relevant Medications    zolpidem (AMBIEN) 10 mg tablet    Other Relevant Orders    Lipid Panel with Direct LDL reflex    Current moderate episode of major depressive disorder without prior episode [facility])     Patient to continue utilizing medical therapy as well and counseling sources as applicable for condition  If  suicidal thought or fear of suicide to contact 911 and seek help immediately  Meds reviewed and patient questions answered today         Relevant Medications    zolpidem (AMBIEN) 10 mg tablet    Anxiety     Patient to continue utilizing medical therapy as well as counseling sources as applicable to condition  If suicidal thought or fear of suicide attempt, to call 911 and seek help immediately  Medications and therapy reviewed today and all patient  questions answered today             Other Visit Diagnoses     Encounter for immunization        Other fatigue        Relevant Orders    TSH + Free T4 Comprehensive metabolic panel    CBC and differential    Magnesium    Uric acid    Urinalysis with microscopic    Other hyperlipidemia                Subjective:      Patient ID: Aruna Cleveland is a 64 y o  female  This is a 79-year-old female here today for checkup on multiple medical problems  Patient has history of anxiety depression GERD and insomnia  Was started recently on trazodone and states she is doing very well with that  Patient is refills on her medicines that she uses for sedation and will follow up with this at her next routine checkup  Patient is due for her yearly lab work and has been ordered for her today  The following portions of the patient's history were reviewed and updated as appropriate:   Past Medical History:  She has a past medical history of Asthma, GERD (gastroesophageal reflux disease), Mixed hyperlipidemia (8/25/2021), and Visual impairment (2019)  ,  _______________________________________________________________________  Medical Problems:  does not have any pertinent problems on file ,  _______________________________________________________________________  Past Surgical History:   has a past surgical history that includes Eye surgery (2020) and Tubal ligation (1993)  ,  _______________________________________________________________________  Family History:  family history includes Arthritis in her father and mother; Bipolar disorder in her son; Breast cancer (age of onset: 76) in her mother; Cancer in her mother; Dementia in her father and mother; Depression in her mother; Heart disease in her mother and sister; No Known Problems in her daughter, maternal aunt, maternal grandmother, paternal grandmother, and sister; Stroke in her mother ,  _______________________________________________________________________  Social History:   reports that she quit smoking about 17 years ago  Her smoking use included cigarettes  She started smoking about 21 years ago   She smoked 0 00 packs per day for 5 00 years  She has never used smokeless tobacco  She reports previous alcohol use of about 3 0 standard drinks of alcohol per week  She reports that she does not use drugs  ,  _______________________________________________________________________  Allergies:  is allergic to augmentin [amoxicillin-pot clavulanate] and keflex [cephalexin]     _______________________________________________________________________  Current Outpatient Medications   Medication Sig Dispense Refill    ALPRAZolam (XANAX) 1 mg tablet TAKE 2 TABLETS (2 MG TOTAL) BY MOUTH DAILY AT BEDTIME AS NEEDED FOR ANXIETY 30 tablet 2    mirtazapine (REMERON) 15 mg tablet TAKE 1 TABLET (15 MG TOTAL) BY MOUTH DAILY AT BEDTIME 30 tablet 1    mirtazapine (REMERON) 15 mg tablet TAKE 1 TABLET (15 MG TOTAL) BY MOUTH DAILY AT BEDTIME 30 tablet 1    traZODone (DESYREL) 150 mg tablet TAKE 1 TABLET (150 MG TOTAL) BY MOUTH DAILY AT BEDTIME AS NEEDED FOR SLEEP 30 tablet 1    zolpidem (AMBIEN) 10 mg tablet 1 5 tabs po q hs prn 135 tablet 1     No current facility-administered medications for this visit      _______________________________________________________________________  Review of Systems   Constitutional: Negative for activity change, appetite change, fatigue and fever  HENT: Negative for congestion, ear pain, postnasal drip, rhinorrhea, sinus pressure, sinus pain, sneezing and sore throat  Eyes: Negative for pain and redness  Respiratory: Negative for apnea, cough, chest tightness, shortness of breath and wheezing  Cardiovascular: Negative for chest pain, palpitations and leg swelling  Gastrointestinal: Negative for abdominal pain, constipation, diarrhea, nausea and vomiting  Endocrine: Negative for cold intolerance and heat intolerance  Genitourinary: Negative for difficulty urinating, dysuria, frequency, hematuria and urgency  Musculoskeletal: Negative for arthralgias, back pain, gait problem and myalgias     Skin: Negative for rash  Neurological: Negative for dizziness, speech difficulty, weakness, numbness and headaches  Hematological: Does not bruise/bleed easily  Psychiatric/Behavioral: Negative for agitation, confusion and hallucinations  Objective:  Vitals:    02/23/22 1406   BP: 132/78   BP Location: Left arm   Patient Position: Sitting   Cuff Size: Large   Pulse: 85   Resp: 20   Temp: 98 1 °F (36 7 °C)   SpO2: 97%   Weight: 113 kg (250 lb)   Height: 5' 2" (1 575 m)     Body mass index is 45 73 kg/m²  Physical Exam  Vitals and nursing note reviewed  Constitutional:       Appearance: She is well-developed  HENT:      Head: Normocephalic and atraumatic  Nose: Nose normal    Eyes:      General: No scleral icterus  Conjunctiva/sclera: Conjunctivae normal       Pupils: Pupils are equal, round, and reactive to light  Neck:      Thyroid: No thyromegaly  Cardiovascular:      Rate and Rhythm: Normal rate and regular rhythm  Pulmonary:      Effort: Pulmonary effort is normal       Breath sounds: Normal breath sounds  No wheezing  Abdominal:      General: Bowel sounds are normal  There is no distension  Palpations: Abdomen is soft  Tenderness: There is no abdominal tenderness  There is no guarding or rebound  Musculoskeletal:         General: No tenderness or deformity  Normal range of motion  Cervical back: Normal range of motion and neck supple  Skin:     General: Skin is warm and dry  Findings: No erythema or rash  Neurological:      Mental Status: She is alert and oriented to person, place, and time  Sensory: No sensory deficit  Psychiatric:         Mood and Affect: Mood normal          Behavior: Behavior normal          Thought Content:  Thought content normal          Judgment: Judgment normal

## 2022-03-23 DIAGNOSIS — F32.A DEPRESSION, UNSPECIFIED DEPRESSION TYPE: ICD-10-CM

## 2022-03-23 RX ORDER — MIRTAZAPINE 15 MG/1
15 TABLET, FILM COATED ORAL
Qty: 30 TABLET | Refills: 1 | Status: SHIPPED | OUTPATIENT
Start: 2022-03-23 | End: 2022-05-17

## 2022-05-05 DIAGNOSIS — G47.00 INSOMNIA, UNSPECIFIED TYPE: ICD-10-CM

## 2022-05-05 RX ORDER — TRAZODONE HYDROCHLORIDE 150 MG/1
150 TABLET ORAL
Qty: 30 TABLET | Refills: 1 | Status: SHIPPED | OUTPATIENT
Start: 2022-05-05 | End: 2022-06-30

## 2022-05-17 DIAGNOSIS — F32.A DEPRESSION, UNSPECIFIED DEPRESSION TYPE: ICD-10-CM

## 2022-05-17 RX ORDER — MIRTAZAPINE 15 MG/1
15 TABLET, FILM COATED ORAL
Qty: 30 TABLET | Refills: 1 | Status: SHIPPED | OUTPATIENT
Start: 2022-05-17 | End: 2022-06-11

## 2022-05-26 DIAGNOSIS — F41.9 ANXIETY: ICD-10-CM

## 2022-05-26 RX ORDER — ALPRAZOLAM 1 MG/1
2 TABLET ORAL
Qty: 30 TABLET | Refills: 1 | Status: SHIPPED | OUTPATIENT
Start: 2022-05-26 | End: 2022-06-15

## 2022-06-02 NOTE — ASSESSMENT & PLAN NOTE
Patient to continue utilizing medical therapy as well and counseling sources as applicable for condition  If  suicidal thought or fear of suicide to contact 911 and seek help immediately   Meds reviewed and patient questions answered today room air

## 2022-06-10 DIAGNOSIS — F32.A DEPRESSION, UNSPECIFIED DEPRESSION TYPE: ICD-10-CM

## 2022-06-11 RX ORDER — MIRTAZAPINE 15 MG/1
15 TABLET, FILM COATED ORAL
Qty: 30 TABLET | Refills: 1 | Status: SHIPPED | OUTPATIENT
Start: 2022-06-11

## 2022-06-15 DIAGNOSIS — F41.9 ANXIETY: ICD-10-CM

## 2022-06-15 RX ORDER — ALPRAZOLAM 1 MG/1
2 TABLET ORAL
Qty: 30 TABLET | Refills: 3 | Status: SHIPPED | OUTPATIENT
Start: 2022-06-15

## 2022-06-30 DIAGNOSIS — G47.00 INSOMNIA, UNSPECIFIED TYPE: ICD-10-CM

## 2022-06-30 RX ORDER — TRAZODONE HYDROCHLORIDE 150 MG/1
150 TABLET ORAL
Qty: 30 TABLET | Refills: 1 | Status: SHIPPED | OUTPATIENT
Start: 2022-06-30

## 2022-08-27 DIAGNOSIS — F32.A DEPRESSION, UNSPECIFIED DEPRESSION TYPE: ICD-10-CM

## 2022-08-27 DIAGNOSIS — G47.00 INSOMNIA, UNSPECIFIED TYPE: ICD-10-CM

## 2022-08-29 RX ORDER — MIRTAZAPINE 15 MG/1
15 TABLET, FILM COATED ORAL
Qty: 30 TABLET | Refills: 1 | Status: SHIPPED | OUTPATIENT
Start: 2022-08-29 | End: 2022-08-31

## 2022-08-29 RX ORDER — TRAZODONE HYDROCHLORIDE 150 MG/1
150 TABLET ORAL
Qty: 30 TABLET | Refills: 1 | Status: SHIPPED | OUTPATIENT
Start: 2022-08-29 | End: 2022-08-31 | Stop reason: SDUPTHER

## 2022-08-29 NOTE — PROGRESS NOTES
Depression Screening and Follow-up Plan: Patient assessed for underlying major depression  Brief counseling provided and recommend additional follow-up/re-evaluation next office visit  Patient advised to follow-up with PCP for further management  Assessment/Plan:         Problem List Items Addressed This Visit        Digestive    GERD (gastroesophageal reflux disease) - Primary     Patient to continue with present therapy and decrease caffeine, avoid ETOH and smoking to decrease acid production  Pt should also cease eating prior to bedtime and avoid excessive fluid intake prior to sleep  May use antacids as needed for breakthrough GERD  All pateint questions answered today about this condition  Other    Morbid obesity with BMI of 45 0-49 9, adult ([facility] [address] ) (Chronic)     Patient to increase exercise and partake of a diet with less calories to promote  weight loss         Relevant Orders    Ambulatory Referral to Weight Management    Insomnia     Discussed with patient use of sedative  medications and good  sleep hygiene to maximize treatment for this problem  Pt  to use sedatives only prior to sleep and cautioned them about usage at any other time  All patient questions about this problem answered today  Relevant Medications    traZODone (DESYREL) 300 MG tablet    Current moderate episode of major depressive disorder without prior episode [facility])     Patient to continue utilizing medical therapy as well and counseling sources as applicable for condition  If  suicidal thought or fear of suicide to contact 911 and seek help immediately  Meds reviewed and patient questions answered today         Relevant Medications    traZODone (DESYREL) 300 MG tablet    Anxiety     Patient to continue utilizing medical therapy as well as counseling sources as applicable to condition  If suicidal thought or fear of suicide attempt, to call 911 and seek help immediately   Medications and therapy reviewed today and all patient  questions answered today  Mixed hyperlipidemia     Patient  is stable with current medication and we discussed a low fat low cholesterol diet  Weight loss also discussed for this will help lower cholesterol also  Recheck lipids in 6 months  Other Visit Diagnoses     Chronic pain of both knees        Relevant Orders    XR knee 3 vw right non injury    XR knee 3 vw left non injury            Subjective:      Patient ID: Juan Velasquez is a 64 y o  female  This 29-year-old female here today for checkup on multiple medical problems her patient history of GERD hyperlipidemia degenerative joint disease of her knees as well as insomnia  Patient is taking a lot of medicine night to sleep she has taken Ambien 2 times at night as well as her Xanax 1 mg at night she takes 2 and also trazodone  Patient requires a lot of medications sleep and says she still gets of [place] night will increase her trazodone to 300 and see if that can able to take some of the burn off some of the pill burden she has  Also discussed with patient about possibly seeing a sleep specialist in regards to her insomnia that getting really hard to manage  Patient also has been having some problems with her knees she has a lot gelling when she sits will see about getting some baseline x-rays see with the status of her arthritis and how bad it is patient may benefit from orthopedic for injections in the near future we will see where she is at with her arthritis  Patient also with some skin tag on her right posterior thigh that can remove the office at her convenience she also has a seborrheic keratosis on the right side of her face over the parotid gland that would need to see a plastic surgeon for facial surgery        The following portions of the patient's history were reviewed and updated as appropriate:   Past Medical History:  She has a past medical history of Asthma, GERD (gastroesophageal reflux disease), Mixed hyperlipidemia (8/25/2021), and Visual impairment (2019)  ,  _______________________________________________________________________  Medical Problems:  does not have any pertinent problems on file ,  _______________________________________________________________________  Past Surgical History:   has a past surgical history that includes Eye surgery (2020) and Tubal ligation (1993)  ,  _______________________________________________________________________  Family History:  family history includes Arthritis in her father and mother; Bipolar disorder in her son; Breast cancer (age of onset: 76) in her mother; Cancer in her mother; Dementia in her father and mother; Depression in her mother; Heart disease in her mother and sister; No Known Problems in her daughter, maternal aunt, maternal grandmother, paternal grandmother, and sister; Stroke in her mother ,  _______________________________________________________________________  Social History:   reports that she quit smoking about 17 years ago  Her smoking use included cigarettes  She started smoking about 22 years ago  She smoked 0 00 packs per day for 5 00 years  She has never used smokeless tobacco  She reports previous alcohol use of about 3 0 standard drinks of alcohol per week  She reports that she does not use drugs  ,  _______________________________________________________________________  Allergies:  is allergic to augmentin [amoxicillin-pot clavulanate] and keflex [cephalexin]     _______________________________________________________________________  Current Outpatient Medications   Medication Sig Dispense Refill    ALPRAZolam (XANAX) 1 mg tablet TAKE 2 TABLETS (2 MG TOTAL) BY MOUTH DAILY AT BEDTIME AS NEEDED FOR ANXIETY 30 tablet 3    traZODone (DESYREL) 300 MG tablet Take 1 tablet (300 mg total) by mouth daily at bedtime as needed for sleep 30 tablet 1    zolpidem (AMBIEN) 10 mg tablet 1 5 tabs po q hs prn 135 tablet 1     No current facility-administered medications for this visit      _______________________________________________________________________  Review of Systems   Constitutional: Negative for activity change, appetite change, fatigue and fever  HENT: Negative for congestion, ear pain, postnasal drip, rhinorrhea, sinus pressure, sinus pain, sneezing and sore throat  Eyes: Negative for pain and redness  Respiratory: Negative for apnea, cough, chest tightness, shortness of breath and wheezing  Cardiovascular: Negative for chest pain, palpitations and leg swelling  Gastrointestinal: Negative for abdominal pain, constipation, diarrhea, nausea and vomiting  Endocrine: Negative for cold intolerance and heat intolerance  Genitourinary: Negative for difficulty urinating, dysuria, frequency, hematuria and urgency  Musculoskeletal: Negative for arthralgias, back pain, gait problem and myalgias  Skin: Negative for rash  Neurological: Negative for dizziness, speech difficulty, weakness, numbness and headaches  Hematological: Does not bruise/bleed easily  Psychiatric/Behavioral: Negative for agitation, confusion and hallucinations  The patient is nervous/anxious  Objective:  Vitals:    08/31/22 0908   BP: 138/88   BP Location: Right arm   Patient Position: Sitting   Cuff Size: Large   Pulse: 91   Temp: 99 °F (37 2 °C)   SpO2: 95%   Weight: 114 kg (252 lb)   Height: 5' 2" (1 575 m)     Body mass index is 46 09 kg/m²       Physical Exam

## 2022-08-31 ENCOUNTER — APPOINTMENT (OUTPATIENT)
Dept: RADIOLOGY | Facility: MEDICAL CENTER | Age: 62
End: 2022-08-31
Payer: COMMERCIAL

## 2022-08-31 ENCOUNTER — OFFICE VISIT (OUTPATIENT)
Dept: FAMILY MEDICINE CLINIC | Facility: CLINIC | Age: 62
End: 2022-08-31
Payer: COMMERCIAL

## 2022-08-31 VITALS
TEMPERATURE: 99 F | HEIGHT: 62 IN | DIASTOLIC BLOOD PRESSURE: 88 MMHG | BODY MASS INDEX: 46.38 KG/M2 | HEART RATE: 91 BPM | SYSTOLIC BLOOD PRESSURE: 138 MMHG | WEIGHT: 252 LBS | OXYGEN SATURATION: 95 %

## 2022-08-31 DIAGNOSIS — E66.01 MORBID OBESITY WITH BMI OF 45.0-49.9, ADULT (HCC): Chronic | ICD-10-CM

## 2022-08-31 DIAGNOSIS — N95.0 PMB (POSTMENOPAUSAL BLEEDING): Primary | ICD-10-CM

## 2022-08-31 DIAGNOSIS — Z12.31 ENCOUNTER FOR SCREENING MAMMOGRAM FOR MALIGNANT NEOPLASM OF BREAST: ICD-10-CM

## 2022-08-31 DIAGNOSIS — M25.561 CHRONIC PAIN OF BOTH KNEES: ICD-10-CM

## 2022-08-31 DIAGNOSIS — G47.00 INSOMNIA, UNSPECIFIED TYPE: ICD-10-CM

## 2022-08-31 DIAGNOSIS — G89.29 CHRONIC PAIN OF BOTH KNEES: ICD-10-CM

## 2022-08-31 DIAGNOSIS — E78.2 MIXED HYPERLIPIDEMIA: ICD-10-CM

## 2022-08-31 DIAGNOSIS — K21.9 GASTROESOPHAGEAL REFLUX DISEASE WITHOUT ESOPHAGITIS: Primary | ICD-10-CM

## 2022-08-31 DIAGNOSIS — F32.1 CURRENT MODERATE EPISODE OF MAJOR DEPRESSIVE DISORDER WITHOUT PRIOR EPISODE (HCC): ICD-10-CM

## 2022-08-31 DIAGNOSIS — M25.562 CHRONIC PAIN OF BOTH KNEES: ICD-10-CM

## 2022-08-31 DIAGNOSIS — F41.9 ANXIETY: ICD-10-CM

## 2022-08-31 PROCEDURE — 73562 X-RAY EXAM OF KNEE 3: CPT

## 2022-08-31 PROCEDURE — 99214 OFFICE O/P EST MOD 30 MIN: CPT | Performed by: FAMILY MEDICINE

## 2022-08-31 RX ORDER — TRAZODONE HYDROCHLORIDE 300 MG/1
300 TABLET ORAL
Qty: 30 TABLET | Refills: 1 | Status: SHIPPED | OUTPATIENT
Start: 2022-08-31 | End: 2022-09-21

## 2022-09-06 ENCOUNTER — TELEPHONE (OUTPATIENT)
Dept: FAMILY MEDICINE CLINIC | Facility: CLINIC | Age: 62
End: 2022-09-06

## 2022-09-06 DIAGNOSIS — L98.9 FACIAL LESION: Primary | ICD-10-CM

## 2022-09-06 NOTE — TELEPHONE ENCOUNTER
Pt called to ask you for a referral for a cosmetic or plastic surgeon the patient stated that at her last office visit with you, you had discussed the lucero on her face and said you would prefer her to have someone else look at it  Pt has another upcoming appt with you in early October but would like to get in to see someone ASAP  Thanks!

## 2022-09-21 DIAGNOSIS — G47.00 INSOMNIA, UNSPECIFIED TYPE: ICD-10-CM

## 2022-09-21 RX ORDER — ZOLPIDEM TARTRATE 10 MG/1
TABLET ORAL
Qty: 135 TABLET | Refills: 1 | Status: SHIPPED | OUTPATIENT
Start: 2022-09-21

## 2022-09-21 RX ORDER — TRAZODONE HYDROCHLORIDE 300 MG/1
300 TABLET ORAL
Qty: 30 TABLET | Refills: 1 | Status: SHIPPED | OUTPATIENT
Start: 2022-09-21

## 2022-09-30 ENCOUNTER — CONSULT (OUTPATIENT)
Dept: PLASTIC SURGERY | Facility: CLINIC | Age: 62
End: 2022-09-30
Payer: COMMERCIAL

## 2022-09-30 VITALS
TEMPERATURE: 97.6 F | DIASTOLIC BLOOD PRESSURE: 65 MMHG | HEART RATE: 99 BPM | WEIGHT: 250 LBS | HEIGHT: 62 IN | SYSTOLIC BLOOD PRESSURE: 104 MMHG | BODY MASS INDEX: 46.01 KG/M2

## 2022-09-30 DIAGNOSIS — L98.9 FACIAL LESION: ICD-10-CM

## 2022-09-30 PROCEDURE — 99203 OFFICE O/P NEW LOW 30 MIN: CPT | Performed by: PLASTIC SURGERY

## 2022-09-30 RX ORDER — MIRTAZAPINE 15 MG/1
15 TABLET, FILM COATED ORAL
COMMUNITY

## 2022-09-30 NOTE — PROGRESS NOTES
Assessment/Plan   66-year-old female with lesion consistent with seborrheic keratosis of and dermal nevus of the face and back respectively  1 ) discussed the risks, benefits, alternatives of excision  2 )  All the patient's questions were answered to her satisfaction a dry  3 )  Will make arrangements have lesions excised in the office with local anesthesia  discussed with the patient the nature of the lesions consistent with a serborrheic keratosis and dermal nevus  I discussed with him the natural history of these skin lesions, the treatment options including conservative management,  I discussed with him the risks, benefits, alternatives of excision including risk of bleeding, infection, scarring, poor wound healing, damage to surrounding and underlying structures, need for further surgery, need for multiple procedures, the risk of recurrence, poor aesthetic result, and the risk of poor scarring, the need for more surgery, contour deformity  I discussed with the patient that this lesion can be excised in the office with local anesthesia  All the patient's questions were answered to their satisfaction, informed consent obtained and signed, we will make arrangements to have the lesion excised in the office  Subjective   Patient ID: Rupal Knowles is a 58 y o  female      Vitals:    09/30/22 1049   BP: 104/65   Pulse: 99   Temp: 97 6 °F (36 4 °C)     HPI patient is a 66-year-old female who is here today to discuss management of skin lesions, patient has a skin lesion on her right jawline as well as a skin lesion on the bra line of her left shoulder wound on her back, she states the lesions have been there for many years, the lesion on her right jawline is continued to grow in size, it is also associated with some pruritus, is not associated with any redness, swelling, drainage, the lesion on her back is also associated with some irritation, she has no major sun exposure, she does not take steroids or blood thinners, she is a nonsmoker  The following portions of the patient's history were reviewed and updated as appropriate: allergies, current medications, past family history, past medical history, past social history, past surgical history and problem list     Review of Systems   All other systems reviewed and are negative  Objective   Physical Exam   Constitutional  She appears well-developed and well-nourished  Eyes  Pupils are equal, round, and reactive to light  System normal      General -             Right: Right eye extraocular movements are normal              Left: Left eye extraocular movements are normal        Skin    Patient with 1 cm x 1 cm raised pleural like lesion on her right jawline consistent with seborrheic keratosis, she also has a similar raised lesion on her back consistent with a dermal nevus, there is chronic inflammation and irritation but no acute infection, no malignant characteristics  Psychiatric  She has a normal mood and affect   Her behavior is normal  Judgment and thought content normal        Past Medical History:   Diagnosis Date    Asthma     GERD (gastroesophageal reflux disease)     Mixed hyperlipidemia 8/25/2021    Visual impairment 2019    macular hole left eye & cataracts     Past Surgical History:   Procedure Laterality Date    EYE SURGERY  2020    macular hole fixed    TUBAL LIGATION  1993    last child     Current Outpatient Medications   Medication Instructions    ALPRAZolam (XANAX) 2 mg, Oral, Daily at bedtime PRN    mirtazapine (REMERON) 15 mg, Oral, Daily at bedtime    traZODone (DESYREL) 300 mg, Oral, Daily at bedtime PRN    zolpidem (AMBIEN) 10 mg tablet 1 5 tabs po q hs prn       Social History     Social History Narrative    Not on file     Social History     Tobacco Use   Smoking Status Former Smoker    Packs/day: 0 00    Years: 5 00    Pack years: 0 00    Types: Cigarettes    Start date: 6/30/2000   Tej Morrow Quit date: 1/1/2005    Years since quittin 7   Smokeless Tobacco Never Used   Tobacco Comment    have not had a cigarette in years

## 2022-10-05 ENCOUNTER — OFFICE VISIT (OUTPATIENT)
Dept: FAMILY MEDICINE CLINIC | Facility: CLINIC | Age: 62
End: 2022-10-05
Payer: COMMERCIAL

## 2022-10-05 VITALS
WEIGHT: 261 LBS | OXYGEN SATURATION: 97 % | DIASTOLIC BLOOD PRESSURE: 86 MMHG | TEMPERATURE: 97.8 F | HEART RATE: 80 BPM | SYSTOLIC BLOOD PRESSURE: 142 MMHG | HEIGHT: 62 IN | BODY MASS INDEX: 48.03 KG/M2

## 2022-10-05 DIAGNOSIS — M25.561 CHRONIC PAIN OF BOTH KNEES: Primary | ICD-10-CM

## 2022-10-05 DIAGNOSIS — G89.29 CHRONIC PAIN OF BOTH KNEES: Primary | ICD-10-CM

## 2022-10-05 DIAGNOSIS — F32.1 CURRENT MODERATE EPISODE OF MAJOR DEPRESSIVE DISORDER WITHOUT PRIOR EPISODE (HCC): ICD-10-CM

## 2022-10-05 DIAGNOSIS — Z23 IMMUNIZATION DUE: ICD-10-CM

## 2022-10-05 DIAGNOSIS — E66.01 MORBID OBESITY WITH BMI OF 45.0-49.9, ADULT (HCC): Chronic | ICD-10-CM

## 2022-10-05 DIAGNOSIS — M25.562 CHRONIC PAIN OF BOTH KNEES: Primary | ICD-10-CM

## 2022-10-05 DIAGNOSIS — K21.9 GASTROESOPHAGEAL REFLUX DISEASE WITHOUT ESOPHAGITIS: ICD-10-CM

## 2022-10-05 DIAGNOSIS — R53.83 OTHER FATIGUE: ICD-10-CM

## 2022-10-05 DIAGNOSIS — Z13.220 SCREENING CHOLESTEROL LEVEL: ICD-10-CM

## 2022-10-05 PROCEDURE — 90471 IMMUNIZATION ADMIN: CPT

## 2022-10-05 PROCEDURE — 99214 OFFICE O/P EST MOD 30 MIN: CPT

## 2022-10-05 PROCEDURE — 90682 RIV4 VACC RECOMBINANT DNA IM: CPT

## 2022-10-05 NOTE — PROGRESS NOTES
Name: Phillip Hilton      : 1960      MRN: [id number]  Encounter Provider: Gareth House MD  Encounter Date: 10/5/2022   Encounter department: [address] [place] [address] Place     1  Chronic pain of both knees  -     Ambulatory Referral to Orthopedic Surgery; Future    2  Gastroesophageal reflux disease without esophagitis    3  Current moderate episode of major depressive disorder without prior episode [facility])  Assessment & Plan:  Patient to continue utilizing medical therapy as well and counseling sources as applicable for condition  If  suicidal thought or fear of suicide to contact 911 and seek help immediately  Meds reviewed and patient questions answered today      4  Morbid obesity with BMI of 45 0-49 9, adult [facility])  Assessment & Plan:  Patient to increase exercise and partake of a diet with less calories to promote  weight loss      5  Immunization due  -     influenza vaccine, quadrivalent, recombinant, PF, 0 5 mL, for patients 18 yr+ (FLUBLOK)    6  Other fatigue  -     TSH + Free T4; Future  -     Comprehensive metabolic panel; Future  -     CBC and differential; Future  -     Magnesium; Future  -     Uric acid; Future  -     Urinalysis with microscopic    7  Screening cholesterol level  -     Lipid Panel with Direct LDL reflex; Future           Subjective     HPI  Review of Systems    Past Medical History:   Diagnosis Date    Anxiety Not sure    2nd marriage/?     Arthritis Unknown    Asthma     Depression     Every now and then    GERD (gastroesophageal reflux disease)     Mixed hyperlipidemia 2021    Obesity 1981    Out of     Visual impairment     macular hole left eye & cataracts     Past Surgical History:   Procedure Laterality Date    EYE SURGERY      macular hole fixed    TUBAL LIGATION      last child     Family History   Problem Relation Age of Onset    Stroke Mother     Heart disease Mother    [facility] Breast cancer Mother 76  Arthritis Mother         Mostly in hands   Mavis Cousin Dementia Mother    Mavis Cousin Cancer Mother     Depression Mother     Heart disease Sister     Bipolar disorder Son     Arthritis Father     Dementia Father     No Known Problems Daughter     No Known Problems Sister     No Known Problems Maternal Aunt     No Known Problems Maternal Grandmother     No Known Problems Paternal Grandmother      Social History     Socioeconomic History    Marital status:       Spouse name: None    Number of children: None    Years of education: None    Highest education level: None   Occupational History    None   Tobacco Use    Smoking status: Former Smoker     Packs/day: 0 00     Years: 5 00     Pack years: 0 00     Types: Cigarettes     Start date: 2000     Quit date: 2005     Years since quittin 7    Smokeless tobacco: Never Used    Tobacco comment: have not had a cigarette in years   Vaping Use    Vaping Use: Never used   Substance and Sexual Activity    Alcohol use: Not Currently     Alcohol/week: 3 0 standard drinks     Types: 1 Glasses of wine, 1 Shots of liquor, 1 Standard drinks or equivalent per week     Comment: on dieting program am not drinking near as much    Drug use: Never    Sexual activity: Yes     Partners: Male     Comment: Tubes tied   Other Topics Concern    None   Social History Narrative    None     Social Determinants of Health     Financial Resource Strain: Not on file   Food Insecurity: Not on file   Transportation Needs: Not on file   Physical Activity: Not on file   Stress: Not on file   Social Connections: Not on file   Intimate Partner Violence: Not on file   Housing Stability: Not on file     Current Outpatient Medications on File Prior to Visit   Medication Sig    ALPRAZolam (XANAX) 1 mg tablet TAKE 2 TABLETS (2 MG TOTAL) BY MOUTH DAILY AT BEDTIME AS NEEDED FOR ANXIETY    mirtazapine (REMERON) 15 mg tablet Take 15 mg by mouth daily at bedtime    traZODone (DESYREL) 300 MG tablet TAKE 1 TABLET (300 MG TOTAL) BY MOUTH DAILY AT BEDTIME AS NEEDED FOR SLEEP    zolpidem (AMBIEN) 10 mg tablet 1 5 tabs po q hs prn     Allergies   Allergen Reactions    Augmentin [Amoxicillin-Pot Clavulanate] Diarrhea    Keflex [Cephalexin] GI Intolerance     Immunization History   Administered Date(s) Administered    COVID-19 J&J (BuscapÃ©) vaccine 0 5 mL 12/04/2021    COVID-19 MODERNA VACC 0 5 ML IM 02/13/2021, 03/13/2021    Influenza, recombinant, quadrivalent,injectable, preservative free 09/30/2020, 10/06/2021    Pneumococcal Conjugate 13-Valent 09/30/2020    Zoster Vaccine Recombinant 07/21/2021, 10/06/2021       Objective     /86 (BP Location: Left arm, Patient Position: Sitting, Cuff Size: Large)   Pulse 80   Temp 97 8 °F (36 6 °C)   Ht 5' 2" (1 575 m)   Wt 118 kg (261 lb)   SpO2 97%   BMI 47 74 kg/m²     Physical Exam  Bruno Mohamud MD

## 2022-10-08 DIAGNOSIS — F41.9 ANXIETY: ICD-10-CM

## 2022-10-11 RX ORDER — ALPRAZOLAM 1 MG/1
2 TABLET ORAL
Qty: 30 TABLET | Refills: 5 | Status: SHIPPED | OUTPATIENT
Start: 2022-10-11

## 2022-10-20 ENCOUNTER — TELEPHONE (OUTPATIENT)
Dept: FAMILY MEDICINE CLINIC | Facility: CLINIC | Age: 62
End: 2022-10-20

## 2022-10-20 DIAGNOSIS — L91.8 SKIN TAG: Primary | ICD-10-CM

## 2022-10-20 NOTE — TELEPHONE ENCOUNTER
Patient would like another skin tag removed on the back of upper thigh of her right leg   She needs an order put into the system in order for them to remove this on 10/26 as she is scheduled to have other skin tags removed that same day

## 2022-10-21 ENCOUNTER — HOSPITAL ENCOUNTER (OUTPATIENT)
Dept: ULTRASOUND IMAGING | Facility: HOSPITAL | Age: 62
End: 2022-10-21
Payer: COMMERCIAL

## 2022-10-21 DIAGNOSIS — N95.0 PMB (POSTMENOPAUSAL BLEEDING): ICD-10-CM

## 2022-10-21 PROCEDURE — 76830 TRANSVAGINAL US NON-OB: CPT

## 2022-10-21 PROCEDURE — 76856 US EXAM PELVIC COMPLETE: CPT

## 2022-10-25 ENCOUNTER — OFFICE VISIT (OUTPATIENT)
Dept: OBGYN CLINIC | Facility: CLINIC | Age: 62
End: 2022-10-25
Payer: COMMERCIAL

## 2022-10-25 VITALS — DIASTOLIC BLOOD PRESSURE: 82 MMHG | SYSTOLIC BLOOD PRESSURE: 122 MMHG

## 2022-10-25 DIAGNOSIS — N95.0 PMB (POSTMENOPAUSAL BLEEDING): Primary | ICD-10-CM

## 2022-10-25 PROCEDURE — 88305 TISSUE EXAM BY PATHOLOGIST: CPT | Performed by: PATHOLOGY

## 2022-10-25 PROCEDURE — 99214 OFFICE O/P EST MOD 30 MIN: CPT | Performed by: OBSTETRICS & GYNECOLOGY

## 2022-10-25 RX ORDER — ASPIRIN 325 MG
325 TABLET ORAL DAILY
COMMUNITY

## 2022-10-25 NOTE — PROGRESS NOTES
Endometrial Biopsy Procedure Note    Pre-operative Diagnosis: pmb    Post-operative Diagnosis: same    Indications: postmenopausal bleeding    Procedure Details    Urine pregnancy test was not done  The risks (including infection, bleeding, pain, and uterine perforation) and benefits of the procedure were explained to the patient and Written informed consent was obtained  The patient was placed in the dorsal lithotomy position  Bimanual exam showed the uterus to be in the anteroflexed position  A speculum inserted in the vagina, and the cervix prepped with povidone iodine  A sharp tenaculum was applied to the anterior lip of the cervix for stabilization  The cervix easy passage  A sterile uterine sound was used to sound the uterus to a depth of 6cm  A Pipelle endometrial aspirator was used to sample the endometrium  A(n) abundant amount of tissue was obtained  Sample was sent for pathologic examination  Condition:  Stable    Complications:  None    Plan:    The patient was advised to call for any fever or for prolonged or severe pain or bleeding  She was advised to use OTC analgesics as needed for mild to moderate pain  She was advised to avoid vaginal intercourse for 48 hours or until the bleeding has completely stopped  Attending Physician Documentation:  I was present for or participated in the entire procedure, including opening and closing

## 2022-10-26 ENCOUNTER — PROCEDURE VISIT (OUTPATIENT)
Dept: PLASTIC SURGERY | Facility: CLINIC | Age: 62
End: 2022-10-26

## 2022-10-26 VITALS
BODY MASS INDEX: 47.74 KG/M2 | RESPIRATION RATE: 20 BRPM | HEIGHT: 62 IN | SYSTOLIC BLOOD PRESSURE: 142 MMHG | DIASTOLIC BLOOD PRESSURE: 80 MMHG

## 2022-10-26 DIAGNOSIS — L98.9 SKIN LESIONS: Primary | ICD-10-CM

## 2022-10-26 RX ORDER — CLINDAMYCIN HYDROCHLORIDE 300 MG/1
300 CAPSULE ORAL 3 TIMES DAILY
Qty: 15 CAPSULE | Refills: 0 | Status: CANCELLED | OUTPATIENT
Start: 2022-10-26 | End: 2022-10-31

## 2022-10-26 NOTE — PROGRESS NOTES
Plastic Surgery Note:    Schuyler Grimes is a 58 y o  who presents for excision of facial and back lesions  Patient has a skin lesion on her right jawline as well as a skin lesion on the bra line of her left shoulder, she states the lesions have been there for many years, the lesion on her right jawline is continued to grow in size, it is also associated with some pruritus, is not associated with any redness, swelling, drainage, the lesion on her back is also associated with some irritation, she has no major sun exposure, she does not take steroids or blood thinners, she is a nonsmoker  PMHx significant for depression, anxiety, obesity, asthma, HLD, and arthritis  She is ready to proceed with excisions today  Reports medication allergies to Augmentin and keflex  The patient would also like a lesion removed from the back of her right thigh  Past Medical History:   Diagnosis Date   • Anxiety Not sure    2nd marriage/1992?    • Arthritis Unknown   • Asthma    • Depression     Every now and then   • GERD (gastroesophageal reflux disease)    • Mixed hyperlipidemia 08/25/2021   • Obesity 1981    Out of    • Visual impairment 2019    macular hole left eye & cataracts       Patient Active Problem List   Diagnosis   • Cellulitis of elbow   • Screening mammogram for high-risk patient   • Insomnia   • SOB (shortness of breath)   • Tinea cruris   • Well female exam with routine gynecological exam   • Morbid obesity with BMI of 45 0-49 9, adult ([facility] [address] )   • Current moderate episode of major depressive disorder without prior episode (HCC)   • Anxiety   • Mixed hyperlipidemia       Allergies   Allergen Reactions   • Augmentin [Amoxicillin-Pot Clavulanate] Diarrhea   • Keflex [Cephalexin] GI Intolerance        Current Outpatient Medications:   •  ALPRAZolam (XANAX) 1 mg tablet, TAKE 2 TABLETS (2 MG TOTAL) BY MOUTH DAILY AT BEDTIME AS NEEDED FOR ANXIETY, Disp: 30 tablet, Rfl: 5  •  aspirin 325 mg tablet, Take 325 mg by mouth daily, Disp: , Rfl:   •  mirtazapine (REMERON) 15 mg tablet, Take 15 mg by mouth daily at bedtime, Disp: , Rfl:   •  traZODone (DESYREL) 300 MG tablet, TAKE 1 TABLET (300 MG TOTAL) BY MOUTH DAILY AT BEDTIME AS NEEDED FOR SLEEP, Disp: 30 tablet, Rfl: 1  •  zolpidem (AMBIEN) 10 mg tablet, 1 5 tabs po q hs prn, Disp: 135 tablet, Rfl: 1     Social History     Tobacco Use   • Smoking status: Former Smoker     Packs/day: 0 00     Years: 5 00     Pack years: 0 00     Types: Cigarettes     Start date: 2000     Quit date: 2005     Years since quittin 8   • Smokeless tobacco: Never Used   • Tobacco comment: have not had a cigarette in years   Substance Use Topics   • Alcohol use: Not Currently     Alcohol/week: 3 0 standard drinks     Types: 1 Glasses of wine, 1 Shots of liquor, 1 Standard drinks or equivalent per week     Comment: on dieting program am not drinking near as much         Physical Exam:  not currently breastfeeding  General: alert, healthy, no distress, well nourished and well developed    Skin: 1 cm x 1 cm raised pleural like lesion on her right jawline consistent with seborrheic keratosis  She also has a similar raised lesion on her back consistent with a dermal nevus, about 5mm x 5mm  Neither lesion has any associated erythema, ulceration, drainage, or evidence of malignancy  A/P:  58year old female with multiple skin lesions       Discussion -   Unfortunately, once the patient was roomed this morning, it was brought to my attention that we did not have the necessary supplies and equipment to properly and safely perform the procedures today  I very apologetic explained the situation with the patient, advising her that once we have all of the necessary supplies and equipment, we will get her back on the schedule for a procedure as soon as possible  I once again offered my sincere apologies   Will reschedule patient for 1 5 hour office procedure for excision of right jaw lesion, left back lesion, and right leg lesion           Robb Bach PA-C   Plastic & Reconstructive Surgery

## 2022-11-04 ENCOUNTER — TELEPHONE (OUTPATIENT)
Dept: OBGYN CLINIC | Facility: CLINIC | Age: 62
End: 2022-11-04

## 2022-11-09 ENCOUNTER — TELEPHONE (OUTPATIENT)
Dept: PLASTIC SURGERY | Facility: CLINIC | Age: 62
End: 2022-11-09

## 2022-11-09 NOTE — TELEPHONE ENCOUNTER
Good afternoon! I tried calling you, but your phone number seems to be out of service  We received a referral for you and we were wondering if you were interested in scheduling an appointment with us  Please call us back at [phone] if you’d like to schedule an appointment  Have a great day!

## 2022-11-09 NOTE — TELEPHONE ENCOUNTER
Attempted to call pt to sched appt from referral, number is invalid, never starts ringing just gives an error tone  Will email pt instead

## 2022-11-18 ENCOUNTER — OFFICE VISIT (OUTPATIENT)
Dept: OBGYN CLINIC | Facility: MEDICAL CENTER | Age: 62
End: 2022-11-18

## 2022-11-18 VITALS
SYSTOLIC BLOOD PRESSURE: 169 MMHG | DIASTOLIC BLOOD PRESSURE: 108 MMHG | HEART RATE: 75 BPM | HEIGHT: 62 IN | BODY MASS INDEX: 48.03 KG/M2 | WEIGHT: 261 LBS

## 2022-11-18 DIAGNOSIS — G89.29 CHRONIC PAIN OF BOTH KNEES: ICD-10-CM

## 2022-11-18 DIAGNOSIS — M17.0 PRIMARY OSTEOARTHRITIS OF BOTH KNEES: Primary | ICD-10-CM

## 2022-11-18 DIAGNOSIS — M25.562 CHRONIC PAIN OF BOTH KNEES: ICD-10-CM

## 2022-11-18 DIAGNOSIS — M25.561 CHRONIC PAIN OF BOTH KNEES: ICD-10-CM

## 2022-11-18 RX ORDER — BUPIVACAINE HYDROCHLORIDE 2.5 MG/ML
4 INJECTION, SOLUTION INFILTRATION; PERINEURAL
Status: COMPLETED | OUTPATIENT
Start: 2022-11-18 | End: 2022-11-18

## 2022-11-18 RX ORDER — BETAMETHASONE SODIUM PHOSPHATE AND BETAMETHASONE ACETATE 3; 3 MG/ML; MG/ML
12 INJECTION, SUSPENSION INTRA-ARTICULAR; INTRALESIONAL; INTRAMUSCULAR; SOFT TISSUE
Status: COMPLETED | OUTPATIENT
Start: 2022-11-18 | End: 2022-11-18

## 2022-11-18 RX ADMIN — BETAMETHASONE SODIUM PHOSPHATE AND BETAMETHASONE ACETATE 12 MG: 3; 3 INJECTION, SUSPENSION INTRA-ARTICULAR; INTRALESIONAL; INTRAMUSCULAR; SOFT TISSUE at 12:13

## 2022-11-18 RX ADMIN — BUPIVACAINE HYDROCHLORIDE 4 ML: 2.5 INJECTION, SOLUTION INFILTRATION; PERINEURAL at 12:13

## 2022-11-18 NOTE — PROGRESS NOTES
Assessment:   Diagnosis ICD-10-CM Associated Orders   1  Primary osteoarthritis of both knees  M17 0 Large joint arthrocentesis: bilateral knee      2  Chronic pain of both knees  M25 561 Ambulatory Referral to Orthopedic Surgery    M25 562 Large joint arthrocentesis: bilateral knee    G89 29           Plan:  Provided bilateral knee steroid injections at today's visit for symptomatic  She tolerated procedure well  Plan to follow up in 3 months for reevaluation  Diagnosis, treatment options and associated risks were discussed with the patient including no treatment, nonsurgical treatment and potential for surgical intervention   The patient was given the opportunity to ask questions regarding each  It was explained to the patient that based upon her elevated BMI, at this point in time, this is not a surgical problem  Treatment for the above diagnoses and associated symptoms of this nature is generally conservative including a physician directed physical therapy program, improved fitness/weight loss, anti-inflammatories, and potentially various injections  Target BMI of less than 40 is needed to pursue elective total knee arthroplasty  Weightbearing and activities as tolerated  To do next visit:  Return in about 3 months (around 2/18/2023)  for repeat evaluation  The above stated was discussed in layman's terms and the patient expressed understanding  All questions were answered to the patient's satisfaction  Subjective:   Maria Isabel Sandoval is a 58 y o  female who presents with bilateral knee pain, right > left for a few years  Notes progressive worsening over the past year or so  Pain worse when going to standing position after prolonged sitting and worse with walking and weight-bearing activities  She walks with a cane at baseline, noting it helps to alleviate her pain  Denies recent trauma or inciting event  Denies previous knee surgery  Denies previous treatment for her knees        Review of systems negative unless otherwise specified in HPI    Past Medical History:   Diagnosis Date   • Anxiety Not sure    2nd marriage/?    • Arthritis Unknown   • Asthma    • Depression     Every now and then   • GERD (gastroesophageal reflux disease)    • Mixed hyperlipidemia 2021   • Obesity 1981    Out of    • Visual impairment 2019    macular hole left eye & cataracts       Past Surgical History:   Procedure Laterality Date   • EYE SURGERY      macular hole fixed   • TUBAL LIGATION      last child       Family History   Problem Relation Age of Onset   • Stroke Mother    • Heart disease Mother    • Breast cancer Mother 76   • Arthritis Mother         Mostly in hands   • Dementia Mother    • Cancer Mother    • Depression Mother    • Heart disease Sister    • Bipolar disorder Son    • Arthritis Father    • Dementia Father    • No Known Problems Daughter    • No Known Problems Sister    • No Known Problems Maternal Aunt    • No Known Problems Maternal Grandmother    • No Known Problems Paternal Grandmother        Social History     Occupational History   • Not on file   Tobacco Use   • Smoking status: Former     Packs/day: 0 00     Years: 5 00     Pack years: 0 00     Types: Cigarettes     Start date: 2000     Quit date: 2005     Years since quittin 8   • Smokeless tobacco: Never   • Tobacco comments:     have not had a cigarette in years   Vaping Use   • Vaping Use: Never used   Substance and Sexual Activity   • Alcohol use: Not Currently     Alcohol/week: 3 0 standard drinks     Types: 1 Glasses of wine, 1 Shots of liquor, 1 Standard drinks or equivalent per week     Comment: on dieting program am not drinking near as much   • Drug use: Never   • Sexual activity: Yes     Partners: Male     Comment: Tubes tied         Current Outpatient Medications:   •  ALPRAZolam (XANAX) 1 mg tablet, TAKE 2 TABLETS (2 MG TOTAL) BY MOUTH DAILY AT BEDTIME AS NEEDED FOR ANXIETY, Disp: 30 tablet, Rfl: 5  •  aspirin 325 mg tablet, Take 325 mg by mouth daily, Disp: , Rfl:   •  mirtazapine (REMERON) 15 mg tablet, Take 15 mg by mouth daily at bedtime, Disp: , Rfl:   •  traZODone (DESYREL) 300 MG tablet, TAKE 1 TABLET (300 MG TOTAL) BY MOUTH DAILY AT BEDTIME AS NEEDED FOR SLEEP, Disp: 30 tablet, Rfl: 1  •  zolpidem (AMBIEN) 10 mg tablet, 1 5 tabs po q hs prn, Disp: 135 tablet, Rfl: 1    Allergies   Allergen Reactions   • Augmentin [Amoxicillin-Pot Clavulanate] Diarrhea   • Keflex [Cephalexin] GI Intolerance            Vitals:    11/18/22 1036   BP: (!) 169/108   Pulse: 75       Objective:                    Right Knee Exam     Muscle Strength   The patient has normal right knee strength  Tenderness   The patient is experiencing tenderness in the medial joint line and lateral joint line  Range of Motion   Extension: abnormal Right knee extension: Unable to get to terminal extension due to increased pain  Flexion: normal     Tests   Valgus: positive    Other   Erythema: absent  Scars: absent  Sensation: normal  Pulse: present  Swelling: none  Effusion: no effusion present    Comments:  Varus alignment of knee      Left Knee Exam     Muscle Strength   The patient has normal left knee strength  Tenderness   The patient is experiencing tenderness in the medial joint line and lateral joint line  Range of Motion   Extension: normal   Flexion: normal     Tests   Valgus: positive    Other   Erythema: absent  Scars: absent  Sensation: normal  Pulse: present  Swelling: none  Effusion: no effusion present    Comments:  Varus alignment of knee          Diagnostics, reviewed and taken today if performed as documented:    Previous bilateral knee Xrays from 08/31/22: tricompartmental arthritis, worse medial bilaterally      The attending physician has personally reviewed the pertinent films in PACS and interpretation is as follows:      Procedures, if performed today:    Large joint arthrocentesis: bilateral knee  Universal Protocol:  Consent: Verbal consent obtained  Risks and benefits: risks, benefits and alternatives were discussed  Consent given by: patient  Time out: Immediately prior to procedure a "time out" was called to verify the correct patient, procedure, equipment, support staff and site/side marked as required  Patient understanding: patient states understanding of the procedure being performed  Site marked: the operative site was marked  Patient identity confirmed: verbally with patient    Supporting Documentation  Indications: pain   Procedure Details  Location: knee - bilateral knee  Needle size: 22 G  Ultrasound guidance: no  Approach: anteromedial    Medications (Right): 4 mL bupivacaine 0 25 %; 12 mg betamethasone acetate-betamethasone sodium phosphate 6 (3-3) mg/mLMedications (Left): 4 mL bupivacaine 0 25 %; 12 mg betamethasone acetate-betamethasone sodium phosphate 6 (3-3) mg/mL   Patient tolerance: patient tolerated the procedure well with no immediate complications  Dressing:  Sterile dressing applied          Portions of the record may have been created with voice recognition software  Occasional wrong word or "sound a like" substitutions may have occurred due to the inherent limitations of voice recognition software  Read the chart carefully and recognize, using context, where substitutions have occurred

## 2022-11-21 ENCOUNTER — OFFICE VISIT (OUTPATIENT)
Dept: OBGYN CLINIC | Facility: CLINIC | Age: 62
End: 2022-11-21

## 2022-11-21 VITALS — BODY MASS INDEX: 47.74 KG/M2 | SYSTOLIC BLOOD PRESSURE: 140 MMHG | DIASTOLIC BLOOD PRESSURE: 90 MMHG | WEIGHT: 261 LBS

## 2022-11-21 DIAGNOSIS — N95.0 PMB (POSTMENOPAUSAL BLEEDING): Primary | ICD-10-CM

## 2022-11-21 NOTE — PROGRESS NOTES
Assessment/Plan:         Diagnoses and all orders for this visit:    PMB (postmenopausal bleeding)          Subjective:      Patient ID: Dania Peters is a 58 y o  female  Patient is a 26-year-old  4 para  who presents with postmenopausal bleeding  An ultrasound showed a thickened endometrial lining up to 25 mm with multiple fibroids in the uterus  Endometrial biopsy was nondiagnostic, but no abnormal cells were noted  She has had increasingly heavy bleeding with clots  She is not on any anticoagulation  She has not had any previous pelvic surgery  Options risks and benefits were discussed  We will proceed with hysteroscopy and D&C possible polypectomy  Consent is signed and all of her questions are answered  The following portions of the patient's history were reviewed and updated as appropriate: allergies, current medications, past family history, past medical history, past social history, past surgical history and problem list     Review of Systems   Constitutional: Negative for chills, diaphoresis, fatigue, fever and unexpected weight change  HENT: Negative for congestion, sinus pressure, sinus pain, tinnitus and trouble swallowing  Eyes: Negative for visual disturbance  Respiratory: Negative for cough, chest tightness and shortness of breath  Cardiovascular: Negative for chest pain, palpitations and leg swelling  Gastrointestinal: Negative for abdominal distention, abdominal pain, anal bleeding, constipation, diarrhea, nausea, rectal pain and vomiting  Endocrine: Negative for heat intolerance  Genitourinary: Positive for vaginal bleeding  Negative for difficulty urinating, dysuria, flank pain, frequency, genital sores, hematuria and urgency  Musculoskeletal: Positive for gait problem  Negative for arthralgias, back pain and joint swelling  Skin: Negative for rash  Allergic/Immunologic: Negative for environmental allergies and food allergies     Neurological: Negative for headaches  Hematological: Negative for adenopathy  Does not bruise/bleed easily  Psychiatric/Behavioral: Negative for decreased concentration and dysphoric mood  The patient is not nervous/anxious  Objective:      /90 (BP Location: Left arm, Patient Position: Sitting, Cuff Size: Large)   Wt 118 kg (261 lb)   BMI 47 74 kg/m²          Physical Exam  Vitals and nursing note reviewed  Exam conducted with a chaperone present  Constitutional:       General: She is not in acute distress  Appearance: Normal appearance  She is normal weight  She is not ill-appearing  HENT:      Head: Normocephalic  Nose: Nose normal       Mouth/Throat:      Mouth: Mucous membranes are moist       Pharynx: Oropharynx is clear  Eyes:      Conjunctiva/sclera: Conjunctivae normal       Pupils: Pupils are equal, round, and reactive to light  Cardiovascular:      Rate and Rhythm: Normal rate and regular rhythm  Pulses: Normal pulses  Pulmonary:      Effort: Pulmonary effort is normal       Breath sounds: Normal breath sounds  Chest:   Breasts: Brenden Score is 5  Right: Normal  No mass, nipple discharge, skin change or tenderness  Left: Normal  No mass, nipple discharge, skin change or tenderness  Abdominal:      General: Abdomen is flat  Bowel sounds are normal       Palpations: Abdomen is soft  Genitourinary:     General: Normal vulva  Exam position: Lithotomy position  Brenden stage (genital): 5       Vagina: Normal       Cervix: Normal       Uterus: Normal        Adnexa: Right adnexa normal and left adnexa normal       Rectum: Normal    Musculoskeletal:         General: Normal range of motion  Cervical back: Neck supple  Lymphadenopathy:      Upper Body:      Right upper body: No axillary adenopathy  Left upper body: No axillary adenopathy  Skin:     General: Skin is warm and dry  Neurological:      General: No focal deficit present  Mental Status: She is alert     Psychiatric:         Mood and Affect: Mood normal

## 2022-11-23 ENCOUNTER — ANESTHESIA EVENT (OUTPATIENT)
Dept: PERIOP | Facility: HOSPITAL | Age: 62
End: 2022-11-23

## 2022-11-23 ENCOUNTER — PREP FOR PROCEDURE (OUTPATIENT)
Dept: OBGYN CLINIC | Facility: CLINIC | Age: 62
End: 2022-11-23

## 2022-11-23 DIAGNOSIS — N95.0 PMB (POSTMENOPAUSAL BLEEDING): Primary | ICD-10-CM

## 2022-11-25 DIAGNOSIS — E66.01 MORBID OBESITY WITH BMI OF 45.0-49.9, ADULT (HCC): Primary | Chronic | ICD-10-CM

## 2022-11-25 NOTE — PRE-PROCEDURE INSTRUCTIONS
Pre-Surgery Instructions:   Medication Instructions   • ALPRAZolam (XANAX) 1 mg tablet Uses PRN- OK to take day of surgery   • ascorbic acid (VITAMIN C) 1000 MG tablet hold starting today   • aspirin 325 mg tablet hold starting today   • Cobalamin Combinations (NEURIVA PLUS PO) hold starting today   • traZODone (DESYREL) 300 MG tablet Take night before surgery   • zolpidem (AMBIEN) 10 mg tablet Take night before surgery      - Reviewed with patient, in detail, instructions from "My Surgical Experience"  - Instructed to avoid all OTC vitamins/supplements and NSAIDS starting today  Tylenol ok to take PRN  - Advised patient nothing eat or drink after midnight prior to surgery, except medications that he/she is to take morning DOS with only small sip of water     - Advised patient that Rico Marks will call with surgery arrival time and hospital directions the business day prior to surgery  Patient verbalized understanding of current visitor restrictions/masking guidelines and advised that he/she can confirm these at time of arrival call with Rico Marks      - Patient verbalized understanding and knows to call surgeon's office with any additional questions prior to surgery  Instructed to call surgeon's office in meantime with any new illnesses/exposure, patient verbalized understanding

## 2022-11-27 PROBLEM — Z01.419 WELL FEMALE EXAM WITH ROUTINE GYNECOLOGICAL EXAM: Status: RESOLVED | Noted: 2020-09-18 | Resolved: 2022-11-27

## 2022-11-27 PROBLEM — Z98.890 S/P D&C (STATUS POST DILATION AND CURETTAGE): Status: ACTIVE | Noted: 2022-11-27

## 2022-11-27 PROBLEM — L03.119 CELLULITIS OF ELBOW: Status: RESOLVED | Noted: 2020-08-28 | Resolved: 2022-11-27

## 2022-11-27 PROBLEM — N95.0 POSTMENOPAUSAL BLEEDING: Status: ACTIVE | Noted: 2022-11-27

## 2022-11-27 PROBLEM — Z12.31 SCREENING MAMMOGRAM FOR HIGH-RISK PATIENT: Status: RESOLVED | Noted: 2020-08-28 | Resolved: 2022-11-27

## 2022-11-27 RX ORDER — IBUPROFEN 600 MG/1
600 TABLET ORAL EVERY 6 HOURS PRN
Qty: 30 TABLET | Refills: 0
Start: 2022-11-27 | End: 2022-11-30 | Stop reason: SDUPTHER

## 2022-11-27 RX ORDER — SENNOSIDES 8.6 MG
650 CAPSULE ORAL EVERY 6 HOURS PRN
Qty: 30 TABLET | Refills: 0
Start: 2022-11-27 | End: 2022-11-30 | Stop reason: SDUPTHER

## 2022-11-27 NOTE — H&P
Patient is a 29-year-old  4 para  who presents with postmenopausal bleeding  An ultrasound showed a thickened endometrial lining up to 25 mm with multiple fibroids in the uterus  Endometrial biopsy was nondiagnostic, but no abnormal cells were noted  She has had increasingly heavy bleeding with clots  She is not on any anticoagulation  She has not had any previous pelvic surgery  Options risks and benefits were discussed  We will proceed with hysteroscopy and D&C possible polypectomy  Consent is signed and all of her questions are answered         The following portions of the patient's history were reviewed and updated as appropriate: allergies, current medications, past family history, past medical history, past social history, past surgical history and problem list      Review of Systems   Constitutional: Negative for chills, diaphoresis, fatigue, fever and unexpected weight change  HENT: Negative for congestion, sinus pressure, sinus pain, tinnitus and trouble swallowing  Eyes: Negative for visual disturbance  Respiratory: Negative for cough, chest tightness and shortness of breath  Cardiovascular: Negative for chest pain, palpitations and leg swelling  Gastrointestinal: Negative for abdominal distention, abdominal pain, anal bleeding, constipation, diarrhea, nausea, rectal pain and vomiting  Endocrine: Negative for heat intolerance  Genitourinary: Positive for vaginal bleeding  Negative for difficulty urinating, dysuria, flank pain, frequency, genital sores, hematuria and urgency  Musculoskeletal: Positive for gait problem  Negative for arthralgias, back pain and joint swelling  Skin: Negative for rash  Allergic/Immunologic: Negative for environmental allergies and food allergies  Neurological: Negative for headaches  Hematological: Negative for adenopathy  Does not bruise/bleed easily     Psychiatric/Behavioral: Negative for decreased concentration and dysphoric mood  The patient is not nervous/anxious            Objective:        /90 (BP Location: Left arm, Patient Position: Sitting, Cuff Size: Large)   Wt 118 kg (261 lb)   BMI 47 74 kg/m²             Physical Exam  Vitals and nursing note reviewed  Exam conducted with a chaperone present  Constitutional:       General: She is not in acute distress  Appearance: Normal appearance  She is normal weight  She is not ill-appearing  HENT:      Head: Normocephalic  Nose: Nose normal       Mouth/Throat:      Mouth: Mucous membranes are moist       Pharynx: Oropharynx is clear  Eyes:      Conjunctiva/sclera: Conjunctivae normal       Pupils: Pupils are equal, round, and reactive to light  Cardiovascular:      Rate and Rhythm: Normal rate and regular rhythm  Pulses: Normal pulses  Pulmonary:      Effort: Pulmonary effort is normal       Breath sounds: Normal breath sounds  Chest:   Breasts: Brenden Score is 5  Right: Normal  No mass, nipple discharge, skin change or tenderness  Left: Normal  No mass, nipple discharge, skin change or tenderness  Abdominal:      General: Abdomen is flat  Bowel sounds are normal       Palpations: Abdomen is soft  Genitourinary:     General: Normal vulva  Exam position: Lithotomy position  Brenden stage (genital): 5       Vagina: Normal       Cervix: Normal       Uterus: Normal        Adnexa: Right adnexa normal and left adnexa normal       Rectum: Normal    Musculoskeletal:         General: Normal range of motion  Cervical back: Neck supple  Lymphadenopathy:      Upper Body:      Right upper body: No axillary adenopathy  Left upper body: No axillary adenopathy  Skin:     General: Skin is warm and dry  Neurological:      General: No focal deficit present  Mental Status: She is alert     Psychiatric:         Mood and Affect: Mood normal

## 2022-11-27 NOTE — ANESTHESIA PREPROCEDURE EVALUATION
Procedure:  DILATATION AND CURETTAGE (D&C) WITH HYSTEROSCOPY (Uterus)    Relevant Problems   CARDIO   (+) Mixed hyperlipidemia      NEURO/PSYCH   (+) Anxiety   (+) Current moderate episode of major depressive disorder without prior episode (HCC)      PULMONARY   (+) SOB (shortness of breath)      Other   (+) Insomnia   (+) Morbid obesity with BMI of 45 0-49 9, adult (HCC)   (+) Postmenopausal bleeding   (+) S/P D&C (status post dilation and curettage)      Lab Results   Component Value Date    WBC 5 16 08/10/2021    HGB 13 9 08/10/2021    HCT 43 8 08/10/2021    MCV 95 08/10/2021     08/10/2021     Lab Results   Component Value Date    SODIUM 145 08/10/2021    K 4 3 08/10/2021     (H) 08/10/2021    CO2 27 08/10/2021    AGAP 6 08/10/2021    BUN 13 08/10/2021    CREATININE 0 89 08/10/2021    GLUC 102 05/19/2020    GLUF 85 08/10/2021    CALCIUM 9 8 08/10/2021    AST 11 08/10/2021    ALT 21 08/10/2021    ALKPHOS 64 08/10/2021    TP 7 3 08/10/2021    TBILI 0 43 08/10/2021    EGFR 71 08/10/2021       Physical Exam    Airway    Mallampati score: II  TM Distance: <3 FB  Neck ROM: full     Dental   No notable dental hx     Cardiovascular      Pulmonary      Other Findings        Anesthesia Plan  ASA Score- 3     Anesthesia Type- general with ASA Monitors  Additional Monitors:   Airway Plan: LMA  Plan Factors-Exercise tolerance (METS): >4 METS  Induction- intravenous  Postoperative Plan- Plan for postoperative opioid use  Informed Consent- Anesthetic plan and risks discussed with patient  I personally reviewed this patient with the CRNA  Discussed and agreed on the Anesthesia Plan with the CRNA  Eugene Stewart

## 2022-11-28 ENCOUNTER — HOSPITAL ENCOUNTER (OUTPATIENT)
Facility: HOSPITAL | Age: 62
Setting detail: OUTPATIENT SURGERY
Discharge: HOME | End: 2022-11-28
Attending: OBSTETRICS & GYNECOLOGY | Admitting: OBSTETRICS & GYNECOLOGY

## 2022-11-28 ENCOUNTER — ANESTHESIA (OUTPATIENT)
Dept: PERIOP | Facility: HOSPITAL | Age: 62
End: 2022-11-28

## 2022-11-28 VITALS
DIASTOLIC BLOOD PRESSURE: 96 MMHG | WEIGHT: 240 LBS | RESPIRATION RATE: 14 BRPM | SYSTOLIC BLOOD PRESSURE: 184 MMHG | OXYGEN SATURATION: 94 % | HEIGHT: 62 IN | TEMPERATURE: 98.9 F | BODY MASS INDEX: 44.16 KG/M2 | HEART RATE: 74 BPM

## 2022-11-28 DIAGNOSIS — Z98.890 S/P D&C (STATUS POST DILATION AND CURETTAGE): Primary | ICD-10-CM

## 2022-11-28 DIAGNOSIS — N95.0 PMB (POSTMENOPAUSAL BLEEDING): ICD-10-CM

## 2022-11-28 RX ORDER — ONDANSETRON 2 MG/ML
4 INJECTION INTRAMUSCULAR; INTRAVENOUS EVERY 6 HOURS PRN
Status: CANCELLED | OUTPATIENT
Start: 2022-11-28

## 2022-11-28 RX ORDER — SODIUM CHLORIDE, SODIUM LACTATE, POTASSIUM CHLORIDE, CALCIUM CHLORIDE 600; 310; 30; 20 MG/100ML; MG/100ML; MG/100ML; MG/100ML
INJECTION, SOLUTION INTRAVENOUS CONTINUOUS PRN
Status: DISCONTINUED | OUTPATIENT
Start: 2022-11-28 | End: 2022-11-28

## 2022-11-28 RX ORDER — PROPOFOL 10 MG/ML
INJECTION, EMULSION INTRAVENOUS AS NEEDED
Status: DISCONTINUED | OUTPATIENT
Start: 2022-11-28 | End: 2022-11-28

## 2022-11-28 RX ORDER — METOCLOPRAMIDE HYDROCHLORIDE 5 MG/ML
10 INJECTION INTRAMUSCULAR; INTRAVENOUS ONCE AS NEEDED
Status: DISCONTINUED | OUTPATIENT
Start: 2022-11-28 | End: 2022-11-28 | Stop reason: HOSPADM

## 2022-11-28 RX ORDER — FENTANYL CITRATE 50 UG/ML
INJECTION, SOLUTION INTRAMUSCULAR; INTRAVENOUS AS NEEDED
Status: DISCONTINUED | OUTPATIENT
Start: 2022-11-28 | End: 2022-11-28

## 2022-11-28 RX ORDER — DIPHENHYDRAMINE HYDROCHLORIDE 50 MG/ML
12.5 INJECTION INTRAMUSCULAR; INTRAVENOUS ONCE AS NEEDED
Status: DISCONTINUED | OUTPATIENT
Start: 2022-11-28 | End: 2022-11-28 | Stop reason: HOSPADM

## 2022-11-28 RX ORDER — DEXAMETHASONE SODIUM PHOSPHATE 10 MG/ML
INJECTION, SOLUTION INTRAMUSCULAR; INTRAVENOUS AS NEEDED
Status: DISCONTINUED | OUTPATIENT
Start: 2022-11-28 | End: 2022-11-28

## 2022-11-28 RX ORDER — MAGNESIUM HYDROXIDE 1200 MG/15ML
LIQUID ORAL AS NEEDED
Status: DISCONTINUED | OUTPATIENT
Start: 2022-11-28 | End: 2022-11-28 | Stop reason: HOSPADM

## 2022-11-28 RX ORDER — IBUPROFEN 600 MG/1
600 TABLET ORAL EVERY 6 HOURS PRN
Status: CANCELLED | OUTPATIENT
Start: 2022-11-28

## 2022-11-28 RX ORDER — FENTANYL CITRATE/PF 50 MCG/ML
50 SYRINGE (ML) INJECTION
Status: DISCONTINUED | OUTPATIENT
Start: 2022-11-28 | End: 2022-11-28 | Stop reason: HOSPADM

## 2022-11-28 RX ORDER — ONDANSETRON 2 MG/ML
INJECTION INTRAMUSCULAR; INTRAVENOUS AS NEEDED
Status: DISCONTINUED | OUTPATIENT
Start: 2022-11-28 | End: 2022-11-28

## 2022-11-28 RX ORDER — LIDOCAINE HYDROCHLORIDE 10 MG/ML
INJECTION, SOLUTION EPIDURAL; INFILTRATION; INTRACAUDAL; PERINEURAL AS NEEDED
Status: DISCONTINUED | OUTPATIENT
Start: 2022-11-28 | End: 2022-11-28

## 2022-11-28 RX ORDER — PROMETHAZINE HYDROCHLORIDE 25 MG/ML
25 INJECTION, SOLUTION INTRAMUSCULAR; INTRAVENOUS ONCE AS NEEDED
Status: DISCONTINUED | OUTPATIENT
Start: 2022-11-28 | End: 2022-11-28 | Stop reason: HOSPADM

## 2022-11-28 RX ORDER — ACETAMINOPHEN 325 MG/1
975 TABLET ORAL EVERY 6 HOURS PRN
Status: CANCELLED | OUTPATIENT
Start: 2022-11-28

## 2022-11-28 RX ADMIN — DEXAMETHASONE SODIUM PHOSPHATE 10 MG: 10 INJECTION, SOLUTION INTRAMUSCULAR; INTRAVENOUS at 14:47

## 2022-11-28 RX ADMIN — LIDOCAINE HYDROCHLORIDE 50 MG: 10 INJECTION, SOLUTION EPIDURAL; INFILTRATION; INTRACAUDAL; PERINEURAL at 14:42

## 2022-11-28 RX ADMIN — ONDANSETRON 4 MG: 2 INJECTION INTRAMUSCULAR; INTRAVENOUS at 15:38

## 2022-11-28 RX ADMIN — FENTANYL CITRATE 50 MCG: 50 INJECTION INTRAMUSCULAR; INTRAVENOUS at 14:46

## 2022-11-28 RX ADMIN — SODIUM CHLORIDE, POTASSIUM CHLORIDE, SODIUM LACTATE AND CALCIUM CHLORIDE: 600; 310; 30; 20 INJECTION, SOLUTION INTRAVENOUS at 14:33

## 2022-11-28 RX ADMIN — PROPOFOL 200 MG: 10 INJECTION, EMULSION INTRAVENOUS at 14:42

## 2022-11-28 RX ADMIN — FENTANYL CITRATE 50 MCG: 50 INJECTION INTRAMUSCULAR; INTRAVENOUS at 15:59

## 2022-11-28 NOTE — ANESTHESIA POSTPROCEDURE EVALUATION
Post-Op Assessment Note    CV Status:  Stable  Pain Score: 0    Pain management: adequate     Mental Status:  Alert and awake   Hydration Status:  Euvolemic   PONV Controlled:  Controlled   Airway Patency:  Patent      Post Op Vitals Reviewed: Yes      Staff: CRNA         No notable events documented      /76 (11/28/22 1548)    Temp 98 4 °F (36 9 °C) (11/28/22 1548)    Pulse 88 (11/28/22 1548)   Resp 14 (11/28/22 1548)    SpO2 93 % (11/28/22 1548)

## 2022-11-28 NOTE — OP NOTE
OPERATIVE REPORT  PATIENT NAME: Selam Zamarripa    :  1960  MRN: [id number]  Pt Location:  OR ROOM 01    SURGERY DATE: 2022    Surgeon(s) and Role:     * True Corley MD - Primary     * Rubi Alejandre MD - Assisting    Preop Diagnosis:  PMB (postmenopausal bleeding) [N95 0]    Post-Op Diagnosis Codes:     * PMB (postmenopausal bleeding) [N95 0]    Procedure(s) (LRB):  DILATATION AND CURETTAGE (D&C) WITH HYSTEROSCOPY (N/A)    Specimen(s):  ID Type Source Tests Collected by Time Destination   1 : endometrial curettage Tissue Endometrium TISSUE EXAM True Corley MD 2022 [id number]        Estimated Blood Loss:   30 mL    Drains:  None    Anesthesia Type:   General LMA    Operative Indications:  PMB (postmenopausal bleeding) [N95 0]    Operative Findings:   1  External genitalia notable for atrophic changes with complete fusion of labia minora and labia majora bilaterally  No ulcerations, lacerations, or lesions  2   Bimanual exam limited secondary to body habitus, uterus normal in size and consistency with no palpable uterine or adnexal masses  3   Vagina and cervix were grossly normal in appearance without any lacerations or lesions  4  Hysteroscopic examination revealed non-proliferative though not entirely atrophic endometrial lining  Several large masses (polyps vs  fibroids) noted to be distorting and entirely filling the endometrial cavity and limiting visualization significantly  Tubal ostia were not visualized secondary to obstruction from the aforementioned masses  Yellow discoloration consistent with likely calcifications visualized on right uterine body wall  The patient was taken to the operating room  General LMA anesthesia (LMA) was administered  Sequential compression devices were placed, and the patient was positioned on the OR table in the dorsal lithotomy position    All pressure points were padded, and a stephany hugger was placed to maintain control of core body temperature  A bimanual exam was performed but limited secondary to body habitus, and the uterus was normal in size and consistency with no palpable uterine or adnexal masses  The patient was prepped and draped in the usual sterile fashion using chlorhexidine  A time out was performed to confirm correct patient and procedure  A straight catheter was introduced into the bladder, which was drained of 5 mL of clear yellow urine  A weighted speculum was inserted into the vagina, and a Walden was used to visualize the posterior lip of the cervix, which was then grasped with a single toothed tenaculum  The cervix was serially dilated to 15 F using Woods dilators for introduction of the hysteroscope  Hysteroscope was introduced under direct visualization using normal saline solution as the distention media  The weighted speculum and Walden were removed from the vagina  The hysteroscope was advanced to the uterine fundus, and the entire uterine cavity was inspected in a systematic manner  Hysteroscopic examination revealed non-proliferative though not entirely atrophic endometrial lining  Several large masses (polyps vs  fibroids) noted to be distorting and entirely filling the endometrial cavity and limiting visualization significantly  Tubal ostia were not visualized secondary to obstruction from the aforementioned masses  Yellow discoloration consistent with likely calcifications visualized on right uterine body wall  The hysteroscope was withdrawn  Polyp forceps were introduced and used to remove portions of the intra-cavitary masses  Sharp curetting was then performed, starting at the 12 o'clock position and rotating a total of 360 degrees to cover all surfaces  Endometrial tissue was obtained and sent for pathology  The hysteroscope was then re-introduced under direct visualization  The entire uterine cavity was inspected in a systematic manner    The endometrial cavity remained distorted and filled by the large masses, though the polypoid tissue seemed to have been removed  What remained appeared to be consistent with fibroid  Visualization remained sub-optimal, and so further instrumentation was deferred  The fluid deficit reached 700 mL  The single toothed tenaculum was removed from the anterior lip of the cervix, and good hemostasis was confirmed  The weighted speculum was removed from the vagina  At the conclusion of the procedure, all needle, sponge, and instrument counts were correct x2  Dr Simran Hayes was present and participated in all key portions of the case      Complications:   None apparent    Patient Disposition:  PACU         SIGNATURE: Naman Vega MD  DATE: November 28, 2022  TIME: 3:48 PM

## 2022-11-29 ENCOUNTER — TELEPHONE (OUTPATIENT)
Dept: OBGYN CLINIC | Facility: CLINIC | Age: 62
End: 2022-11-29

## 2022-11-29 NOTE — TELEPHONE ENCOUNTER
Patient has some questions ,   she wants to make sure you know she is taking asprin 325mg everyday ,   she has not received a call stating you sent RX for motrin or tylenol to the pharmacy   Could the post op cramping she has cause back pain ?

## 2022-12-13 ENCOUNTER — OFFICE VISIT (OUTPATIENT)
Dept: OBGYN CLINIC | Facility: CLINIC | Age: 62
End: 2022-12-13

## 2022-12-13 ENCOUNTER — PATIENT OUTREACH (OUTPATIENT)
Dept: CASE MANAGEMENT | Facility: HOSPITAL | Age: 62
End: 2022-12-13

## 2022-12-13 ENCOUNTER — TELEPHONE (OUTPATIENT)
Dept: GYNECOLOGIC ONCOLOGY | Facility: CLINIC | Age: 62
End: 2022-12-13

## 2022-12-13 DIAGNOSIS — C54.1 ENDOMETRIAL CANCER (HCC): Primary | ICD-10-CM

## 2022-12-13 NOTE — PROGRESS NOTES
OncSW referral received, chart review completed  Pt will consult with Dr Med Montes in January, outreach will be attempted when a tx recommendation has been made

## 2022-12-13 NOTE — PROGRESS NOTES
Assessment      Doing well postoperatively  Operative findings again reviewed  Pathology report discussed  The patient has been given a referral for gyn-oncology consult: pathology revealed endometrioid adenocarcinoma grade 2  Plan     1  Continue any current medications  2  Wound care discussed  3  Activity restrictions: none  4  Anticipated return to work: now  5  Follow up: as soon as possible , for gyn oncology consult          Mary Walker is a 58 y o  female who presents to the clinic 2 weeks status post operative hysteroscopy for abnormal uterine bleeding  Eating a regular diet without difficulty  Bowel movements are normal  The patient is not having any pain  The following portions of the patient's history were reviewed and updated as appropriate: allergies, current medications, past family history, past medical history, past social history, past surgical history and problem list     Review of Systems  Pertinent items are noted in HPI  Objective     There were no vitals taken for this visit    General:  alert and oriented, in no acute distress   Abdomen: soft, bowel sounds active, non-tender   Incision:   healing well, no drainage, no erythema, no hernia, no seroma, no swelling, no dehiscence, incision well approximated

## 2022-12-14 ENCOUNTER — DOCUMENTATION (OUTPATIENT)
Dept: HEMATOLOGY ONCOLOGY | Facility: CLINIC | Age: 62
End: 2022-12-14

## 2022-12-21 DIAGNOSIS — G47.00 INSOMNIA, UNSPECIFIED TYPE: ICD-10-CM

## 2022-12-21 RX ORDER — TRAZODONE HYDROCHLORIDE 300 MG/1
300 TABLET ORAL
Qty: 30 TABLET | Refills: 1 | Status: SHIPPED | OUTPATIENT
Start: 2022-12-21

## 2022-12-26 DIAGNOSIS — Z98.890 S/P D&C (STATUS POST DILATION AND CURETTAGE): ICD-10-CM

## 2023-01-01 DIAGNOSIS — C54.1 ENDOMETRIAL CANCER (HCC): Primary | ICD-10-CM

## 2023-01-03 RX ORDER — IBUPROFEN 600 MG/1
600 TABLET ORAL EVERY 6 HOURS PRN
Qty: 30 TABLET | Refills: 0 | Status: SHIPPED | OUTPATIENT
Start: 2023-01-03

## 2023-01-04 ENCOUNTER — CONSULT (OUTPATIENT)
Dept: GYNECOLOGIC ONCOLOGY | Facility: CLINIC | Age: 63
End: 2023-01-04

## 2023-01-04 ENCOUNTER — HOSPITAL ENCOUNTER (OUTPATIENT)
Dept: NON INVASIVE DIAGNOSTICS | Facility: CLINIC | Age: 63
Discharge: HOME | End: 2023-01-04

## 2023-01-04 VITALS
BODY MASS INDEX: 44.16 KG/M2 | WEIGHT: 240 LBS | HEART RATE: 87 BPM | TEMPERATURE: 98 F | RESPIRATION RATE: 18 BRPM | OXYGEN SATURATION: 98 % | SYSTOLIC BLOOD PRESSURE: 130 MMHG | DIASTOLIC BLOOD PRESSURE: 84 MMHG | HEIGHT: 62 IN

## 2023-01-04 DIAGNOSIS — M79.661 BILATERAL CALF PAIN: ICD-10-CM

## 2023-01-04 DIAGNOSIS — M79.662 BILATERAL CALF PAIN: ICD-10-CM

## 2023-01-04 DIAGNOSIS — M79.662 BILATERAL CALF PAIN: Primary | ICD-10-CM

## 2023-01-04 DIAGNOSIS — C54.1 ENDOMETRIAL CANCER (HCC): ICD-10-CM

## 2023-01-04 DIAGNOSIS — M79.661 BILATERAL CALF PAIN: Primary | ICD-10-CM

## 2023-01-04 RX ORDER — CELECOXIB 100 MG/1
200 CAPSULE ORAL ONCE
OUTPATIENT
Start: 2023-01-04 | End: 2023-01-04

## 2023-01-04 RX ORDER — ACETAMINOPHEN 325 MG/1
975 TABLET ORAL ONCE
OUTPATIENT
Start: 2023-01-04 | End: 2023-01-04

## 2023-01-04 RX ORDER — GABAPENTIN 100 MG/1
100 CAPSULE ORAL ONCE
OUTPATIENT
Start: 2023-01-04 | End: 2023-01-04

## 2023-01-04 RX ORDER — ENOXAPARIN SODIUM 300 MG/3ML
40 INJECTION INTRAVENOUS; SUBCUTANEOUS
OUTPATIENT
Start: 2023-01-04 | End: 2023-01-05

## 2023-01-04 NOTE — ASSESSMENT & PLAN NOTE
The patient has a new diagnosis of endometrial adenocarcinoma by her most recent biopsy  This appears to be early stage by clinical exam   We have discussed treatment options for this disease including chemotherapy, radiation therapy, and hormonal management  I have stated that medical evidence indicates that at this point to surgical management is her best option  I have discussed also the risks and benefits of lymph node biopsy at the time of surgery  We have discussed open versus laparoscopic versus robotic approach and have recommended the following:    Robotically assisted total laparoscopic hysterectomy bilateral salpingo-oophorectomy with sentinel lymph node biopsy  Vulvar biopsy will also be performed due to recently diagnosed vulvar lesion have discussed risks and benefits of the procedure including bleeding requiring transfusion infection, infection, damage to local structures including bowel bladder ureter and other local organs  We discussed the risk of deep venous thrombosis  All of these complications are in the 2-4% range of likelihood  An open procedure may be required  The patient understands the risks and benefits of the procedure and has signed an informed consent  I personally signed the consent form with her  She does understand that further treatment including chemotherapy radiation therapy or hormones may be required based on the final postoperative pathologic diagnosis and staging  Standard preoperative testing including type and screen is CBC CPM P chest x-ray and EKG will be ordered  Additionally a CAT scan will be performed as the length of symptoms was over 6 months and the patient has a grade 2 tumor any appropriate consultations for preoperative evaluation will also be ordered  Overall consultation took 60 min with greater than 50% in dedicated toward discussion time  Patiently due to a new complaint of calf tightness and discomfort Dopplers will be ordered

## 2023-01-04 NOTE — H&P (VIEW-ONLY)
Assessment/Plan:    Problem List Items Addressed This Visit        Genitourinary    Endometrial cancer [facility])     The patient has a new diagnosis of endometrial adenocarcinoma by her most recent biopsy  This appears to be early stage by clinical exam   We have discussed treatment options for this disease including chemotherapy, radiation therapy, and hormonal management  I have stated that medical evidence indicates that at this point to surgical management is her best option  I have discussed also the risks and benefits of lymph node biopsy at the time of surgery  We have discussed open versus laparoscopic versus robotic approach and have recommended the following:    Robotically assisted total laparoscopic hysterectomy bilateral salpingo-oophorectomy with sentinel lymph node biopsy  Vulvar biopsy will also be performed due to recently diagnosed vulvar lesion have discussed risks and benefits of the procedure including bleeding requiring transfusion infection, infection, damage to local structures including bowel bladder ureter and other local organs  We discussed the risk of deep venous thrombosis  All of these complications are in the 2-4% range of likelihood  An open procedure may be required  The patient understands the risks and benefits of the procedure and has signed an informed consent  I personally signed the consent form with her  She does understand that further treatment including chemotherapy radiation therapy or hormones may be required based on the final postoperative pathologic diagnosis and staging  Standard preoperative testing including type and screen is CBC CPM P chest x-ray and EKG will be ordered  Additionally a CAT scan will be performed as the length of symptoms was over 6 months and the patient has a grade 2 tumor any appropriate consultations for preoperative evaluation will also be ordered    Overall consultation took 60 min with greater than 50% in dedicated toward discussion time     Patiently due to a new complaint of calf tightness and discomfort Dopplers will be ordered  Relevant Orders    Case request operating room: HYSTERECTOMY LAPAROSCOPIC TOTAL ([address]) bilateral salpingo-oophorectomy sentinel lymph node mapping and biopsy W/ ROBOTICS (Completed)    Type and screen    CBC and differential    Comprehensive metabolic panel    HEMOGLOBIN A1C W/ EAG ESTIMATION    CT chest abdomen pelvis w contrast    VAS reflux lower limb venous duplex study with reflux assessment, complete bilateral   Other Visit Diagnoses     Bilateral calf pain    -  Primary    Relevant Orders    VAS reflux lower limb venous duplex study with reflux assessment, complete bilateral              CHIEF COMPLAINT: Newly diagnosed endometrial cancer      Subjective:     Problem:  Cancer Staging   No matching staging information was found for the patient  Previous therapy:  Oncology History   Endometrial cancer ([facility] [address] )   1/4/2023 Initial Diagnosis    Endometrial cancer ([facility] [address] )     1/4/2023 -  Cancer Staged    Staging form: Corpus Uteri - Carcinoma, AJCC 8th Edition  - Clinical stage from 1/4/2023: FIGO Stage I (cT1, cN0, cM0) - Signed by Cammy Menard MD on 1/4/2023  Stage prefix: Initial diagnosis  Histologic grade (G): G2  Histologic grading system: 3 grade system             Patient ID: Allyson Dubose is a 58 y o  female  Patient is a very pleasant 58-year-old female seen in consultation from Dr Melissa Gonzalez regarding evaluation and management of newly diagnosed grade 2 endometrial cancer  The patient was in her usual state of health until she began with a brown vaginal spotting over the course of approximately 6 months  This progressed to 2 bleeding and the patient eventually sought care  She underwent an ultrasound in October 2022 which revealed the following:  FINDINGS:     UTERUS:  The uterus is anteverted in position, measuring 12 2 x 8 5 x 9 9 cm     Rounded well-defined nodule(s) present, likely representing myoma(s), as follows:  Fibroid 1: 4 0 x 4 2 x 3 8 cm  Intramural location  Left uterine body location  Fibroid 2: 2 9 x 2 7 x 3 4 cm  Intramural location  Anterior right fundal location  The cervix appears within normal limits      ENDOMETRIUM:    There is markedly abnormal thickening of the endometrium to as much is 25 mm in AP dimension with heterogeneous echotexture and internal vascularity on Doppler interrogation      OVARIES/ADNEXA:  Neither postmenopausal ovary is discretely identifiable by ultrasound  No suspicious adnexal mass or loculated collections  There is no free fluid      IMPRESSION:     Abnormal heterogeneous and thickened endometrium with internal vascularity on Doppler interrogation suspicious for endometrial malignancy  Tissue sampling is recommended      Prominent intramural fibroids as described above      This examination was marked "significant notification" in Epic in order to begin the standard process by which the radiology reading room liaison alerts the referring practitioner  Patient underwent D&C in November 2022 which revealed the following:  Final Diagnosis  A  Uterine contents, "Endometrium," Endometrial curettage:  - Scant benign cervical squamous mucosa (<1 mm)  - No endometrial tissue present    A repeat D&C was performed revealing the following:  Final Diagnosis  A  Endometrium, curettage:     - Endometrioid adenocarcinoma, FIGO grade 2    Today, the patient is doing well  She denies significant abdominal pain, pelvic pain, nausea, vomiting, constipation, diarrhea, fevers, chills, she does note some lower abdominal cramping             Review of Systems   Constitutional: Negative  HENT: Negative  Eyes: Negative  Respiratory: Negative  Cardiovascular: Negative  Gastrointestinal: Negative  Endocrine: Negative  Genitourinary: Positive for pelvic pain, vaginal bleeding and vaginal discharge  Musculoskeletal: Negative  Skin: Negative  Neurological: Negative  Hematological: Negative  Psychiatric/Behavioral: Negative  Current Outpatient Medications   Medication Sig Dispense Refill   • acetaminophen (TYLENOL) 650 mg CR tablet Take 1 tablet (650 mg total) by mouth every 6 (six) hours as needed for mild pain or moderate pain 30 tablet 0   • ALPRAZolam (XANAX) 1 mg tablet TAKE 2 TABLETS (2 MG TOTAL) BY MOUTH DAILY AT BEDTIME AS NEEDED FOR ANXIETY 30 tablet 5   • ascorbic acid (VITAMIN C) 1000 MG tablet Take 1,000 mg by mouth daily     • aspirin 325 mg tablet Take 325 mg by mouth daily     • Cobalamin Combinations (NEURIVA PLUS PO) Take by mouth     • ibuprofen (MOTRIN) 600 mg tablet TAKE 1 TABLET (600 MG TOTAL) BY MOUTH EVERY 6 (SIX) HOURS AS NEEDED FOR MILD PAIN OR MODERATE PAIN 30 tablet 0   • traZODone (DESYREL) 300 MG tablet TAKE 1 TABLET (300 MG TOTAL) BY MOUTH DAILY AT BEDTIME AS NEEDED FOR SLEEP 30 tablet 1   • zolpidem (AMBIEN) 10 mg tablet 1 5 tabs po q hs prn 135 tablet 1   • mirtazapine (REMERON) 15 mg tablet Take 15 mg by mouth daily at bedtime (Patient not taking: Reported on 11/25/2022)       No current facility-administered medications for this visit  Allergies   Allergen Reactions   • Augmentin [Amoxicillin-Pot Clavulanate] Diarrhea   • Keflex [Cephalexin] GI Intolerance       Past Medical History:   Diagnosis Date   • Anxiety Not sure    2nd marriage/1992?    • Arthritis Unknown   • Asthma    • Depression     Every now and then   • GERD (gastroesophageal reflux disease)    • Mixed hyperlipidemia 08/25/2021   • Obesity 1981    Out of    • Sleep apnea     pt was advised to get sleep study and never completed   • Visual impairment 2019    macular hole left eye & cataracts       Past Surgical History:   Procedure Laterality Date   • EYE SURGERY  2020    macular hole fixed   • CA HYSTEROSCOPY BX ENDOMETRIUM&/POLYPC W/WO D&C N/A 11/28/2022    Procedure: DILATATION AND CURETTAGE (D&C) WITH HYSTEROSCOPY;  Surgeon: Malone Snellen, MD;  Location:  MAIN OR;  Service: Gynecology   • [address]    last child       OB History        4    Para   2    Term   2            AB   2    Living   2       SAB        IAB        Ectopic        Multiple        Live Births   2                 Family History   Problem Relation Age of Onset   • Stroke Mother    • Heart disease Mother    • Breast cancer Mother 76   • Arthritis Mother         Mostly in hands   • Dementia Mother    • Cancer Mother    • Depression Mother    • Arthritis Father    • Dementia Father    • Diabetes Sister    • No Known Problems Sister    • No Known Problems Maternal Aunt    • No Known Problems Maternal Grandmother    • No Known Problems Paternal Grandmother    • No Known Problems Daughter    • Bipolar disorder Son        The following portions of the patient's history were reviewed and updated as appropriate: allergies, current medications, past family history, past social history, past surgical history and problem list       Objective:    Blood pressure 130/84, pulse 87, temperature 98 °F (36 7 °C), temperature source Temporal, resp  rate 18, height 5' 2" (1 575 m), weight 109 kg (240 lb), SpO2 98 %, not currently breastfeeding  Body mass index is 43 9 kg/m²  Physical Exam  Constitutional:       Appearance: She is well-developed  HENT:      Head: Normocephalic and atraumatic  Eyes:      Pupils: Pupils are equal, round, and reactive to light  Cardiovascular:      Rate and Rhythm: Normal rate and regular rhythm  Heart sounds: Normal heart sounds  Pulmonary:      Effort: Pulmonary effort is normal  No respiratory distress  Breath sounds: Normal breath sounds  Abdominal:      General: Bowel sounds are normal  There is no distension  Palpations: Abdomen is soft  Abdomen is not rigid  Tenderness: There is no abdominal tenderness  There is no guarding or rebound     Genitourinary:     Comments:  external female genitalia with parchment paper changes consistent with lichen sclerosis, normal Bartholin's and Cataula's glands                  -Normal midline urethral meatus  No lesions notes                  -Bladder without fullness mass or tenderness                  -Vagina without lesion or discharge No significant cystocele or rectocele noted blood in vault                  -Cervix normal appearing without visible lesions                  -Uterus with normal contour, mobility  No tenderness,                  -Adnexae without  mass or tenderness                  - Anus without fissure of lesion    Musculoskeletal:         General: Normal range of motion  Cervical back: Normal range of motion and neck supple  Lymphadenopathy:      Cervical: No cervical adenopathy  Upper Body:      Right upper body: No supraclavicular adenopathy  Left upper body: No supraclavicular adenopathy  Skin:     General: Skin is warm and dry  Neurological:      Mental Status: She is alert and oriented to person, place, and time     Psychiatric:         Behavior: Behavior normal            No results found for:   Lab Results   Component Value Date    WBC 5 16 08/10/2021    HGB 13 9 08/10/2021    HCT 43 8 08/10/2021    MCV 95 08/10/2021     08/10/2021     Lab Results   Component Value Date    K 4 3 08/10/2021     (H) 08/10/2021    CO2 27 08/10/2021    BUN 13 08/10/2021    CREATININE 0 89 08/10/2021    GLUF 85 08/10/2021    CALCIUM 9 8 08/10/2021    CORRECTEDCA 9 8 09/28/2020    AST 11 08/10/2021    ALT 21 08/10/2021    ALKPHOS 64 08/10/2021    EGFR 71 08/10/2021        Trend:  No results found for:

## 2023-01-05 ENCOUNTER — PATIENT OUTREACH (OUTPATIENT)
Dept: CASE MANAGEMENT | Facility: HOSPITAL | Age: 63
End: 2023-01-05

## 2023-01-05 ENCOUNTER — APPOINTMENT (OUTPATIENT)
Dept: LAB | Facility: MEDICAL CENTER | Age: 63
End: 2023-01-05

## 2023-01-05 ENCOUNTER — LAB REQUISITION (OUTPATIENT)
Dept: LAB | Facility: HOSPITAL | Age: 63
End: 2023-01-05

## 2023-01-05 DIAGNOSIS — C54.1 ENDOMETRIAL CANCER (HCC): ICD-10-CM

## 2023-01-05 DIAGNOSIS — C54.1 MALIGNANT NEOPLASM OF ENDOMETRIUM (HCC): ICD-10-CM

## 2023-01-05 DIAGNOSIS — G47.00 INSOMNIA, UNSPECIFIED TYPE: ICD-10-CM

## 2023-01-05 DIAGNOSIS — R53.83 OTHER FATIGUE: ICD-10-CM

## 2023-01-05 DIAGNOSIS — E78.2 MIXED HYPERLIPIDEMIA: ICD-10-CM

## 2023-01-05 DIAGNOSIS — Z13.220 SCREENING CHOLESTEROL LEVEL: ICD-10-CM

## 2023-01-05 LAB
ABO GROUP BLD: NORMAL
ALBUMIN SERPL BCP-MCNC: 3.3 G/DL (ref 3.5–5)
ALP SERPL-CCNC: 77 U/L (ref 46–116)
ALT SERPL W P-5'-P-CCNC: 15 U/L (ref 12–78)
ANION GAP SERPL CALCULATED.3IONS-SCNC: 2 MMOL/L (ref 4–13)
AST SERPL W P-5'-P-CCNC: 8 U/L (ref 5–45)
BACTERIA UR QL AUTO: ABNORMAL /HPF
BASOPHILS # BLD AUTO: 0.02 THOUSANDS/ÂΜL (ref 0–0.1)
BASOPHILS NFR BLD AUTO: 0 % (ref 0–1)
BILIRUB SERPL-MCNC: 0.55 MG/DL (ref 0.2–1)
BILIRUB UR QL STRIP: NEGATIVE
BLD GP AB SCN SERPL QL: NEGATIVE
BUN SERPL-MCNC: 7 MG/DL (ref 5–25)
CALCIUM ALBUM COR SERPL-MCNC: 9.9 MG/DL (ref 8.3–10.1)
CALCIUM SERPL-MCNC: 9.3 MG/DL (ref 8.3–10.1)
CHLORIDE SERPL-SCNC: 111 MMOL/L (ref 96–108)
CHOLEST SERPL-MCNC: 261 MG/DL
CLARITY UR: ABNORMAL
CO2 SERPL-SCNC: 33 MMOL/L (ref 21–32)
COLOR UR: ABNORMAL
CREAT SERPL-MCNC: 0.84 MG/DL (ref 0.6–1.3)
EOSINOPHIL # BLD AUTO: 0.11 THOUSAND/ÂΜL (ref 0–0.61)
EOSINOPHIL NFR BLD AUTO: 2 % (ref 0–6)
ERYTHROCYTE [DISTWIDTH] IN BLOOD BY AUTOMATED COUNT: 13.3 % (ref 11.6–15.1)
GFR SERPL CREATININE-BSD FRML MDRD: 74 ML/MIN/1.73SQ M
GLUCOSE P FAST SERPL-MCNC: 98 MG/DL (ref 65–99)
GLUCOSE UR STRIP-MCNC: NEGATIVE MG/DL
HCT VFR BLD AUTO: 45 % (ref 34.8–46.1)
HDLC SERPL-MCNC: 71 MG/DL
HGB BLD-MCNC: 13.8 G/DL (ref 11.5–15.4)
HGB UR QL STRIP.AUTO: ABNORMAL
IMM GRANULOCYTES # BLD AUTO: 0.02 THOUSAND/UL (ref 0–0.2)
IMM GRANULOCYTES NFR BLD AUTO: 0 % (ref 0–2)
KETONES UR STRIP-MCNC: NEGATIVE MG/DL
LDLC SERPL CALC-MCNC: 159 MG/DL (ref 0–100)
LEUKOCYTE ESTERASE UR QL STRIP: ABNORMAL
LYMPHOCYTES # BLD AUTO: 1.58 THOUSANDS/ÂΜL (ref 0.6–4.47)
LYMPHOCYTES NFR BLD AUTO: 30 % (ref 14–44)
MAGNESIUM SERPL-MCNC: 2.2 MG/DL (ref 1.6–2.6)
MCH RBC QN AUTO: 30.8 PG (ref 26.8–34.3)
MCHC RBC AUTO-ENTMCNC: 30.7 G/DL (ref 31.4–37.4)
MCV RBC AUTO: 100 FL (ref 82–98)
MONOCYTES # BLD AUTO: 0.32 THOUSAND/ÂΜL (ref 0.17–1.22)
MONOCYTES NFR BLD AUTO: 6 % (ref 4–12)
MUCOUS THREADS UR QL AUTO: ABNORMAL
NEUTROPHILS # BLD AUTO: 3.21 THOUSANDS/ÂΜL (ref 1.85–7.62)
NEUTS SEG NFR BLD AUTO: 62 % (ref 43–75)
NITRITE UR QL STRIP: NEGATIVE
NON-SQ EPI CELLS URNS QL MICRO: ABNORMAL /HPF
NRBC BLD AUTO-RTO: 0 /100 WBCS
PH UR STRIP.AUTO: 6 [PH]
PLATELET # BLD AUTO: 244 THOUSANDS/UL (ref 149–390)
PMV BLD AUTO: 9.6 FL (ref 8.9–12.7)
POTASSIUM SERPL-SCNC: 4.2 MMOL/L (ref 3.5–5.3)
PROT SERPL-MCNC: 6.7 G/DL (ref 6.4–8.4)
PROT UR STRIP-MCNC: ABNORMAL MG/DL
RBC # BLD AUTO: 4.48 MILLION/UL (ref 3.81–5.12)
RBC #/AREA URNS AUTO: ABNORMAL /HPF
RH BLD: POSITIVE
SODIUM SERPL-SCNC: 146 MMOL/L (ref 135–147)
SP GR UR STRIP.AUTO: 1.02 (ref 1–1.03)
SPECIMEN EXPIRATION DATE: NORMAL
T4 FREE SERPL-MCNC: 0.88 NG/DL (ref 0.76–1.46)
TRANS CELLS #/AREA URNS HPF: PRESENT /[HPF]
TRIGL SERPL-MCNC: 153 MG/DL
TSH SERPL DL<=0.05 MIU/L-ACNC: 1.16 UIU/ML (ref 0.45–4.5)
URATE SERPL-MCNC: 5.5 MG/DL (ref 2–7.5)
UROBILINOGEN UR STRIP-ACNC: <2 MG/DL
WBC # BLD AUTO: 5.26 THOUSAND/UL (ref 4.31–10.16)
WBC #/AREA URNS AUTO: ABNORMAL /HPF

## 2023-01-05 NOTE — PROGRESS NOTES
Biopsychosocial and Barriers Assessment    Cancer Diagnosis: endometrial, early stage  Home/Cell Phone: [phone]  Emergency Contact: Devon Li  Marital Status:   Interpretation concerns, speaks another language, preferred language: speaks [place]  Cultural concerns: none noted  Ability to read or write: independent    Caregiver/Support: roommate at home, one adult son  Children: adult son  Child/Elder care: NA    Housing: rents an apartment  Home Setup:   Lives With: roommate  Daily Living Activities: independent, has arthritis in her knees  Durable Medical Equipment: has a cane, uses it PRN  Ambulation: independent, cane PRN    Preferred Pharmacy:   High co-pays with insurance: Not a concern  High co-pays with medication coverage: not a concern  No medication coverage: NA    Primary Care Provider:  Dr Tenisha Buchanan  Hx of [address]: none noted  Hx of Short term rehab: none  Mental Health Hx: none  Substance Abuse Hx: none  Employment: "semi retired" see note  [place] Status/Location:  of a   Ability to pay bills: not a concern  POA/LW/AD:  Transportation Plan/Concerns: roommate will assist as needed      What do you know about your Cancer Diagnosis    What has your doctor told you about your cancer diagnosis:    What has your doctor told you about your cancer treatment:    What specific concerns do you have about your diagnosis and treatment:    Have you been made aware of any hair loss associated with treatment:    Additional Comments:      MSW s/w pt by phone this afternoon in advance of her upcoming surgery with Dr Tatum Anaya later this month  She was appreciative of the call and happy to know she has a  available to her  She expressed how happy she has been with her care through [facility]  She tells me that she is prepared for her surgery as much as she can be, but acknowledges that she may benefit from some emotional support if she requires additional treatment    She is  and lives in an apartment that she shares with a roommate  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

## 2023-01-08 LAB
EST. AVERAGE GLUCOSE BLD GHB EST-MCNC: 108 MG/DL
HBA1C MFR BLD: 5.4 %

## 2023-01-10 ENCOUNTER — HOSPITAL ENCOUNTER (OUTPATIENT)
Dept: RADIOLOGY | Facility: MEDICAL CENTER | Age: 63
Discharge: HOME | End: 2023-01-10

## 2023-01-10 DIAGNOSIS — C54.1 ENDOMETRIAL CANCER (HCC): ICD-10-CM

## 2023-01-10 RX ADMIN — IOHEXOL 100 ML: 350 INJECTION, SOLUTION INTRAVENOUS at 12:55

## 2023-01-11 ENCOUNTER — APPOINTMENT (OUTPATIENT)
Dept: LAB | Facility: MEDICAL CENTER | Age: 63
End: 2023-01-11

## 2023-01-11 ENCOUNTER — APPOINTMENT (OUTPATIENT)
Dept: RADIOLOGY | Facility: MEDICAL CENTER | Age: 63
End: 2023-01-11

## 2023-01-11 ENCOUNTER — OFFICE VISIT (OUTPATIENT)
Dept: FAMILY MEDICINE CLINIC | Facility: CLINIC | Age: 63
End: 2023-01-11

## 2023-01-11 VITALS
BODY MASS INDEX: 44.53 KG/M2 | HEIGHT: 62 IN | HEART RATE: 76 BPM | OXYGEN SATURATION: 97 % | DIASTOLIC BLOOD PRESSURE: 94 MMHG | TEMPERATURE: 97.3 F | WEIGHT: 242 LBS | SYSTOLIC BLOOD PRESSURE: 146 MMHG

## 2023-01-11 DIAGNOSIS — F32.1 CURRENT MODERATE EPISODE OF MAJOR DEPRESSIVE DISORDER WITHOUT PRIOR EPISODE (HCC): ICD-10-CM

## 2023-01-11 DIAGNOSIS — E66.01 MORBID OBESITY WITH BMI OF 45.0-49.9, ADULT (HCC): Chronic | ICD-10-CM

## 2023-01-11 DIAGNOSIS — Z23 ENCOUNTER FOR IMMUNIZATION: ICD-10-CM

## 2023-01-11 DIAGNOSIS — M25.511 ACUTE PAIN OF RIGHT SHOULDER: ICD-10-CM

## 2023-01-11 DIAGNOSIS — F41.9 ANXIETY: Primary | ICD-10-CM

## 2023-01-11 DIAGNOSIS — G47.00 INSOMNIA, UNSPECIFIED TYPE: ICD-10-CM

## 2023-01-11 DIAGNOSIS — Z12.31 ENCOUNTER FOR SCREENING MAMMOGRAM FOR BREAST CANCER: ICD-10-CM

## 2023-01-11 DIAGNOSIS — E78.2 MIXED HYPERLIPIDEMIA: ICD-10-CM

## 2023-01-11 RX ORDER — NALTREXONE HYDROCHLORIDE AND BUPROPION HYDROCHLORIDE 8; 90 MG/1; MG/1
1 TABLET, EXTENDED RELEASE ORAL DAILY
Qty: 30 TABLET | Refills: 1 | Status: SHIPPED | OUTPATIENT
Start: 2023-01-11

## 2023-01-11 RX ORDER — ZOLPIDEM TARTRATE 10 MG/1
TABLET ORAL
Qty: 135 TABLET | Refills: 1 | Status: SHIPPED | OUTPATIENT
Start: 2023-01-11

## 2023-01-11 RX ORDER — ALPRAZOLAM 1 MG/1
2 TABLET ORAL
Qty: 30 TABLET | Refills: 5 | Status: SHIPPED | OUTPATIENT
Start: 2023-01-11

## 2023-01-11 RX ORDER — TRAZODONE HYDROCHLORIDE 300 MG/1
300 TABLET ORAL
Qty: 30 TABLET | Refills: 1 | Status: SHIPPED | OUTPATIENT
Start: 2023-01-11

## 2023-01-11 NOTE — PROGRESS NOTES
Name: Shivani Sood      : 1960      MRN: [id number]  Encounter Provider: Sena Light MD  Encounter Date: 2023   Encounter department: [address] [place] [address] Place     1  Anxiety  Assessment & Plan:  Patient to continue utilizing medical therapy as well as counseling sources as applicable to condition  If suicidal thought or fear of suicide attempt, to call 911 and seek help immediately  Medications and therapy reviewed today and all patient  questions answered today  Orders:  -     ALPRAZolam (XANAX) 1 mg tablet; Take 2 tablets (2 mg total) by mouth daily at bedtime as needed for anxiety    2  Encounter for immunization    3  Encounter for screening mammogram for breast cancer  -     Mammo screening bilateral w 3d & cad; Future; Expected date: 2023    4  Current moderate episode of major depressive disorder without prior episode [facility])  Assessment & Plan:  Patient to continue utilizing medical therapy as well and counseling sources as applicable for condition  If  suicidal thought or fear of suicide to contact 911 and seek help immediately  Meds reviewed and patient questions answered today      5  Insomnia, unspecified type  Assessment & Plan:  Discussed with patient use of sedative  medications and good  sleep hygiene to maximize treatment for this problem  Pt  to use sedatives only prior to sleep and cautioned them about usage at any other time  All patient questions about this problem answered today  Orders:  -     traZODone (DESYREL) 300 MG tablet; Take 1 tablet (300 mg total) by mouth daily at bedtime as needed for sleep  -     zolpidem (AMBIEN) 10 mg tablet; 1 5 tabs po q hs prn    6  Mixed hyperlipidemia  Assessment & Plan:  Patient  is stable with current medication and we discussed a low fat low cholesterol diet  Weight loss also discussed for this will help lower cholesterol also  Recheck lipids in 6 months        7  Morbid obesity with BMI of 45 0-49 9, adult [facility])  Assessment & Plan:  Patient to increase exercise and partake of a diet with less calories to promote  weight loss    Orders:  -     Naltrexone-buPROPion HCl ER (Contrave) 8-90 MG TB12; Take 1 tablet by mouth in the morning    8  Acute pain of right shoulder  -     XR shoulder 2+ vw right; Future; Expected date: 01/11/2023      BMI Counseling: Body mass index is 44 26 kg/m²  The BMI is above normal  Nutrition recommendations include decreasing portion sizes, encouraging healthy choices of fruits and vegetables, decreasing fast food intake, consuming healthier snacks, limiting drinks that contain sugar, moderation in carbohydrate intake, increasing intake of lean protein, reducing intake of saturated and trans fat and reducing intake of cholesterol  Exercise recommendations include exercising 3-5 times per week  No pharmacotherapy was ordered  Patient referred to PCP  Rationale for BMI follow-up plan is due to patient being overweight or obese  Depression Screening and Follow-up Plan: Patient assessed for underlying major depression  Brief counseling provided and recommend additional follow-up/re-evaluation next office visit  Patient advised to follow-up with PCP for further management  Subjective     51-year-old female today for checkup on multiple medical problems  Patient with degenerative joint disease in both of her knees as well as an acute problem with pain down her right shoulder  Patient with no injury to her right shoulder however she does have pain from sleeping on it she states that she does have some pain top of her shoulder as well as the front of her shoulder she is range of motion in all planes but does bother her at times and she is afraid she is going to drop things in the right hand    Patient also has seen orthopedics and was talked about getting replacements of her knees but she is to lose weight first discussed with patient about weight loss and will try some Contrave as long as she is coverage for that to try and help her down we talked her about weight loss with dietary control  Discussed with patient about a bariatric consultation for weight loss and she will think about that  Patient is refills on all of her medications she also has insomnia when she uses her medicines for that as well as for some mood issues  Patient have a stressful time right now with her significant other having some financial difficulties as well as her own health problems  Review of Systems   Constitutional: Negative for activity change, appetite change, fatigue and fever  HENT: Negative for congestion, ear pain, postnasal drip, rhinorrhea, sinus pressure, sinus pain, sneezing and sore throat  Eyes: Negative for pain and redness  Respiratory: Negative for apnea, cough, chest tightness, shortness of breath and wheezing  Cardiovascular: Negative for chest pain, palpitations and leg swelling  Gastrointestinal: Negative for abdominal pain, constipation, diarrhea, nausea and vomiting  Endocrine: Negative for cold intolerance and heat intolerance  Genitourinary: Negative for difficulty urinating, dysuria, frequency, hematuria and urgency  Musculoskeletal: Positive for arthralgias and gait problem  Negative for back pain and myalgias  Skin: Negative for rash  Neurological: Negative for dizziness, speech difficulty, weakness, numbness and headaches  Hematological: Does not bruise/bleed easily  Psychiatric/Behavioral: Negative for agitation, confusion and hallucinations  Past Medical History:   Diagnosis Date   • Anxiety Not sure    2nd marriage/1992?    • Arthritis Unknown   • Asthma    • Depression     Every now and then   • GERD (gastroesophageal reflux disease)    • Mixed hyperlipidemia 08/25/2021   • Obesity 1981    Out of    • Sleep apnea     pt was advised to get sleep study and never completed   • Visual impairment 2019    macular hole left eye & cataracts     Past Surgical History:   Procedure Laterality Date   • EYE SURGERY      macular hole fixed   • SC HYSTEROSCOPY BX ENDOMETRIUM&/POLYPC W/WO D&C N/A 2022    Procedure: DILATATION AND CURETTAGE (D&C) WITH HYSTEROSCOPY;  Surgeon: Chan Jaimes MD;  Location:  MAIN OR;  Service: Gynecology   • Luke Freedman    last child     Family History   Problem Relation Age of Onset   • Stroke Mother    • Heart disease Mother    • Breast cancer Mother 76   • Arthritis Mother         Mostly in hands   • Dementia Mother    • Cancer Mother    • Depression Mother    • Arthritis Father    • Dementia Father    • Diabetes Sister    • No Known Problems Sister    • No Known Problems Maternal Aunt    • No Known Problems Maternal Grandmother    • No Known Problems Paternal Grandmother    • No Known Problems Daughter    • Bipolar disorder Son      Social History     Socioeconomic History   • Marital status:       Spouse name: None   • Number of children: None   • Years of education: None   • Highest education level: None   Occupational History   • None   Tobacco Use   • Smoking status: Former     Packs/day: 0 00     Years: 5 00     Pack years: 0 00     Types: Cigarettes     Start date: 2000     Quit date: 2005     Years since quittin 0   • Smokeless tobacco: Never   • Tobacco comments:     have not had a cigarette in years   Vaping Use   • Vaping Use: Never used   Substance and Sexual Activity   • Alcohol use: Not Currently     Alcohol/week: 3 0 standard drinks     Types: 1 Glasses of wine, 1 Shots of liquor, 1 Standard drinks or equivalent per week     Comment: on dieting program am not drinking near as much   • Drug use: Never   • Sexual activity: Not Currently     Partners: Male     Comment: Tubes tied   Other Topics Concern   • None   Social History Narrative   • None     Social Determinants of Health     Financial Resource Strain: Not on file   Food Insecurity: Not on file Transportation Needs: Not on file   Physical Activity: Not on file   Stress: Not on file   Social Connections: Not on file   Intimate Partner Violence: Not on file   Housing Stability: Not on file     Current Outpatient Medications on File Prior to Visit   Medication Sig   • acetaminophen (TYLENOL) 650 mg CR tablet Take 1 tablet (650 mg total) by mouth every 6 (six) hours as needed for mild pain or moderate pain   • ascorbic acid (VITAMIN C) 1000 MG tablet Take 1,000 mg by mouth daily   • Cobalamin Combinations (NEURIVA PLUS PO) Take by mouth   • ibuprofen (MOTRIN) 600 mg tablet TAKE 1 TABLET (600 MG TOTAL) BY MOUTH EVERY 6 (SIX) HOURS AS NEEDED FOR MILD PAIN OR MODERATE PAIN   • [DISCONTINUED] ALPRAZolam (XANAX) 1 mg tablet TAKE 2 TABLETS (2 MG TOTAL) BY MOUTH DAILY AT BEDTIME AS NEEDED FOR ANXIETY   • [DISCONTINUED] traZODone (DESYREL) 300 MG tablet TAKE 1 TABLET (300 MG TOTAL) BY MOUTH DAILY AT BEDTIME AS NEEDED FOR SLEEP   • [DISCONTINUED] zolpidem (AMBIEN) 10 mg tablet 1 5 tabs po q hs prn   • [DISCONTINUED] aspirin 325 mg tablet Take 325 mg by mouth daily   • [DISCONTINUED] mirtazapine (REMERON) 15 mg tablet Take 15 mg by mouth daily at bedtime (Patient not taking: Reported on 11/25/2022)     Allergies   Allergen Reactions   • Augmentin [Amoxicillin-Pot Clavulanate] Diarrhea   • Keflex [Cephalexin] GI Intolerance     Immunization History   Administered Date(s) Administered   • COVID-19 J&J ([employer]) vaccine 0 5 mL 12/04/2021   • COVID-19 MODERNA VACC 0 5 ML IM 02/13/2021, 03/13/2021   • Influenza, recombinant, quadrivalent,injectable, preservative free 09/30/2020, 10/06/2021, 10/05/2022   • Pneumococcal Conjugate 13-Valent 09/30/2020   • Zoster Vaccine Recombinant 07/21/2021, 10/06/2021       Objective     /94 (BP Location: Left arm, Patient Position: Sitting, Cuff Size: Large)   Pulse 76   Temp (!) 97 3 °F (36 3 °C) (Temporal)   Ht 5' 2" (1 575 m)   Wt 110 kg (242 lb)   SpO2 97%   BMI 44 26 kg/m²     Physical Exam  Vitals and nursing note reviewed  Constitutional:       Appearance: She is well-developed  HENT:      Head: Normocephalic and atraumatic  Nose: Nose normal       Mouth/Throat:      Mouth: Mucous membranes are moist    Eyes:      General: No scleral icterus  Conjunctiva/sclera: Conjunctivae normal       Pupils: Pupils are equal, round, and reactive to light  Neck:      Thyroid: No thyromegaly  Cardiovascular:      Rate and Rhythm: Normal rate and regular rhythm  Pulmonary:      Effort: Pulmonary effort is normal       Breath sounds: Normal breath sounds  No wheezing  Abdominal:      General: Bowel sounds are normal  There is no distension  Palpations: Abdomen is soft  Tenderness: There is no abdominal tenderness  There is no guarding or rebound  Musculoskeletal:         General: Tenderness present  No deformity  Normal range of motion  Cervical back: Normal range of motion and neck supple  Skin:     General: Skin is warm and dry  Findings: No erythema or rash  Neurological:      Mental Status: She is alert and oriented to person, place, and time  Sensory: No sensory deficit  Gait: Gait abnormal    Psychiatric:         Mood and Affect: Mood normal          Behavior: Behavior normal          Thought Content:  Thought content normal          Judgment: Judgment normal        Molina Mcintyre MD

## 2023-01-23 ENCOUNTER — HOSPITAL ENCOUNTER (OUTPATIENT)
Dept: ULTRASOUND IMAGING | Facility: CLINIC | Age: 63
Discharge: HOME | End: 2023-01-23

## 2023-01-23 ENCOUNTER — HOSPITAL ENCOUNTER (OUTPATIENT)
Dept: MAMMOGRAPHY | Facility: CLINIC | Age: 63
Discharge: HOME | End: 2023-01-23

## 2023-01-23 VITALS — BODY MASS INDEX: 44.53 KG/M2 | WEIGHT: 242 LBS | HEIGHT: 62 IN

## 2023-01-23 DIAGNOSIS — R92.8 ABNORMAL MAMMOGRAM: ICD-10-CM

## 2023-01-24 ENCOUNTER — TELEPHONE (OUTPATIENT)
Dept: GYNECOLOGIC ONCOLOGY | Facility: CLINIC | Age: 63
End: 2023-01-24

## 2023-01-24 ENCOUNTER — ANESTHESIA EVENT (OUTPATIENT)
Dept: PERIOP | Facility: HOSPITAL | Age: 63
End: 2023-01-24

## 2023-01-24 RX ORDER — EPINEPHRINE INHALATION 125 UG/1
1 AEROSOL RESPIRATORY (INHALATION)
COMMUNITY

## 2023-01-24 NOTE — TELEPHONE ENCOUNTER
LMOM for patient making her aware that postoperative Eliquis was sent [place]  She will not start the medication until she is discharged home after surgery

## 2023-01-24 NOTE — PRE-PROCEDURE INSTRUCTIONS
Pre-Surgery Instructions:   Medication Instructions   • acetaminophen (TYLENOL) 650 mg CR tablet Uses PRN- OK to take day of surgery   • ALPRAZolam (XANAX) 1 mg tablet Uses PRN- OK to take day of surgery   • ascorbic acid (VITAMIN C) 1000 MG tablet Stop 1/24  Do not take morning of surgery   • Cobalamin Combinations (NEURIVA PLUS PO) Take day of surgery  • EPINEPHrine (Primatene Mist) 0 125 MG/ACT AERO Uses PRN- OK to take day of surgery   • ibuprofen (MOTRIN) 600 mg tablet Stop 1/24  Do not take morning of surgery   • Naltrexone-buPROPion HCl ER (Contrave) 8-90 MG TB12 Take day of surgery  • traZODone (DESYREL) 300 MG tablet Take night before surgery   • zolpidem (AMBIEN) 10 mg tablet Take night before surgery   Covid screening negative as per patient  Fully vaccinated  Reviewed showering and medication instructions  Take medications morning of surgery with a small sip of water  Patient verbalized understanding  Advised NPO after MN and ASC will call with scheduled surgical time  Advised to notify surgeon's office for any illness prior to surgery  Patient received pre-operative drinks  She is aware when to consume

## 2023-01-25 PROBLEM — B35.6 TINEA CRURIS: Status: RESOLVED | Noted: 2020-09-18 | Resolved: 2023-01-25

## 2023-01-25 PROBLEM — Z98.890 S/P D&C (STATUS POST DILATION AND CURETTAGE): Status: RESOLVED | Noted: 2022-11-27 | Resolved: 2023-01-25

## 2023-01-26 ENCOUNTER — HOSPITAL ENCOUNTER (OUTPATIENT)
Facility: HOSPITAL | Age: 63
Setting detail: OUTPATIENT SURGERY
Discharge: HOME | End: 2023-01-26
Attending: OBSTETRICS & GYNECOLOGY | Admitting: OBSTETRICS & GYNECOLOGY

## 2023-01-26 ENCOUNTER — ANESTHESIA (OUTPATIENT)
Dept: PERIOP | Facility: HOSPITAL | Age: 63
End: 2023-01-26

## 2023-01-26 VITALS
SYSTOLIC BLOOD PRESSURE: 165 MMHG | OXYGEN SATURATION: 96 % | DIASTOLIC BLOOD PRESSURE: 78 MMHG | RESPIRATION RATE: 20 BRPM | HEART RATE: 94 BPM | TEMPERATURE: 97.8 F

## 2023-01-26 DIAGNOSIS — C54.1 ENDOMETRIAL CANCER (HCC): ICD-10-CM

## 2023-01-26 DIAGNOSIS — Z98.890 S/P D&C (STATUS POST DILATION AND CURETTAGE): ICD-10-CM

## 2023-01-26 DIAGNOSIS — G89.18 POSTOPERATIVE PAIN: Primary | ICD-10-CM

## 2023-01-26 LAB — GLUCOSE SERPL-MCNC: 99 MG/DL (ref 65–140)

## 2023-01-26 RX ORDER — IBUPROFEN 600 MG/1
600 TABLET ORAL EVERY 6 HOURS PRN
Status: DISCONTINUED | OUTPATIENT
Start: 2023-01-26 | End: 2023-01-26 | Stop reason: HOSPADM

## 2023-01-26 RX ORDER — CEFAZOLIN SODIUM 2 G/50ML
2000 SOLUTION INTRAVENOUS ONCE
Status: COMPLETED | OUTPATIENT
Start: 2023-01-26 | End: 2023-01-26

## 2023-01-26 RX ORDER — GABAPENTIN 100 MG/1
100 CAPSULE ORAL ONCE
Status: COMPLETED | OUTPATIENT
Start: 2023-01-26 | End: 2023-01-26

## 2023-01-26 RX ORDER — ONDANSETRON 2 MG/ML
4 INJECTION INTRAMUSCULAR; INTRAVENOUS EVERY 6 HOURS PRN
Status: DISCONTINUED | OUTPATIENT
Start: 2023-01-26 | End: 2023-01-26 | Stop reason: HOSPADM

## 2023-01-26 RX ORDER — DEXAMETHASONE SODIUM PHOSPHATE 10 MG/ML
INJECTION, SOLUTION INTRAMUSCULAR; INTRAVENOUS AS NEEDED
Status: DISCONTINUED | OUTPATIENT
Start: 2023-01-26 | End: 2023-01-26

## 2023-01-26 RX ORDER — HYDROMORPHONE HCL/PF 1 MG/ML
0.5 SYRINGE (ML) INJECTION
Status: DISCONTINUED | OUTPATIENT
Start: 2023-01-26 | End: 2023-01-26 | Stop reason: HOSPADM

## 2023-01-26 RX ORDER — ACETAMINOPHEN 325 MG/1
975 TABLET ORAL EVERY 6 HOURS PRN
Status: DISCONTINUED | OUTPATIENT
Start: 2023-01-26 | End: 2023-01-26 | Stop reason: HOSPADM

## 2023-01-26 RX ORDER — SODIUM CHLORIDE, SODIUM LACTATE, POTASSIUM CHLORIDE, CALCIUM CHLORIDE 600; 310; 30; 20 MG/100ML; MG/100ML; MG/100ML; MG/100ML
20 INJECTION, SOLUTION INTRAVENOUS CONTINUOUS
Status: DISCONTINUED | OUTPATIENT
Start: 2023-01-26 | End: 2023-01-26 | Stop reason: HOSPADM

## 2023-01-26 RX ORDER — KETOROLAC TROMETHAMINE 30 MG/ML
INJECTION, SOLUTION INTRAMUSCULAR; INTRAVENOUS AS NEEDED
Status: DISCONTINUED | OUTPATIENT
Start: 2023-01-26 | End: 2023-01-26

## 2023-01-26 RX ORDER — SODIUM CHLORIDE, SODIUM LACTATE, POTASSIUM CHLORIDE, CALCIUM CHLORIDE 600; 310; 30; 20 MG/100ML; MG/100ML; MG/100ML; MG/100ML
INJECTION, SOLUTION INTRAVENOUS CONTINUOUS PRN
Status: DISCONTINUED | OUTPATIENT
Start: 2023-01-26 | End: 2023-01-26

## 2023-01-26 RX ORDER — MIDAZOLAM HYDROCHLORIDE 2 MG/2ML
INJECTION, SOLUTION INTRAMUSCULAR; INTRAVENOUS AS NEEDED
Status: DISCONTINUED | OUTPATIENT
Start: 2023-01-26 | End: 2023-01-26

## 2023-01-26 RX ORDER — MAGNESIUM HYDROXIDE 1200 MG/15ML
LIQUID ORAL AS NEEDED
Status: DISCONTINUED | OUTPATIENT
Start: 2023-01-26 | End: 2023-01-26 | Stop reason: HOSPADM

## 2023-01-26 RX ORDER — ENOXAPARIN SODIUM 100 MG/ML
40 INJECTION SUBCUTANEOUS
Status: COMPLETED | OUTPATIENT
Start: 2023-01-26 | End: 2023-01-26

## 2023-01-26 RX ORDER — OXYCODONE HYDROCHLORIDE 5 MG/1
TABLET ORAL
Qty: 10 TABLET | Refills: 0 | Status: SHIPPED | OUTPATIENT
Start: 2023-01-26

## 2023-01-26 RX ORDER — ONDANSETRON 2 MG/ML
INJECTION INTRAMUSCULAR; INTRAVENOUS AS NEEDED
Status: DISCONTINUED | OUTPATIENT
Start: 2023-01-26 | End: 2023-01-26

## 2023-01-26 RX ORDER — ROCURONIUM BROMIDE 10 MG/ML
INJECTION, SOLUTION INTRAVENOUS AS NEEDED
Status: DISCONTINUED | OUTPATIENT
Start: 2023-01-26 | End: 2023-01-26

## 2023-01-26 RX ORDER — PROPOFOL 10 MG/ML
INJECTION, EMULSION INTRAVENOUS CONTINUOUS PRN
Status: DISCONTINUED | OUTPATIENT
Start: 2023-01-26 | End: 2023-01-26

## 2023-01-26 RX ORDER — HYDROMORPHONE HCL/PF 1 MG/ML
SYRINGE (ML) INJECTION AS NEEDED
Status: DISCONTINUED | OUTPATIENT
Start: 2023-01-26 | End: 2023-01-26

## 2023-01-26 RX ORDER — LIDOCAINE HYDROCHLORIDE 20 MG/ML
INJECTION, SOLUTION EPIDURAL; INFILTRATION; INTRACAUDAL; PERINEURAL AS NEEDED
Status: DISCONTINUED | OUTPATIENT
Start: 2023-01-26 | End: 2023-01-26

## 2023-01-26 RX ORDER — ONDANSETRON 2 MG/ML
4 INJECTION INTRAMUSCULAR; INTRAVENOUS ONCE AS NEEDED
Status: DISCONTINUED | OUTPATIENT
Start: 2023-01-26 | End: 2023-01-26 | Stop reason: HOSPADM

## 2023-01-26 RX ORDER — ACETAMINOPHEN 325 MG/1
975 TABLET ORAL ONCE
Status: COMPLETED | OUTPATIENT
Start: 2023-01-26 | End: 2023-01-26

## 2023-01-26 RX ORDER — INDOCYANINE GREEN AND WATER 25 MG
KIT INJECTION AS NEEDED
Status: DISCONTINUED | OUTPATIENT
Start: 2023-01-26 | End: 2023-01-26 | Stop reason: HOSPADM

## 2023-01-26 RX ORDER — FENTANYL CITRATE/PF 50 MCG/ML
25 SYRINGE (ML) INJECTION
Status: DISCONTINUED | OUTPATIENT
Start: 2023-01-26 | End: 2023-01-26 | Stop reason: HOSPADM

## 2023-01-26 RX ORDER — PROPOFOL 10 MG/ML
INJECTION, EMULSION INTRAVENOUS AS NEEDED
Status: DISCONTINUED | OUTPATIENT
Start: 2023-01-26 | End: 2023-01-26

## 2023-01-26 RX ORDER — CELECOXIB 100 MG/1
200 CAPSULE ORAL ONCE
Status: COMPLETED | OUTPATIENT
Start: 2023-01-26 | End: 2023-01-26

## 2023-01-26 RX ORDER — OXYCODONE HYDROCHLORIDE 5 MG/1
10 TABLET ORAL EVERY 4 HOURS PRN
Status: DISCONTINUED | OUTPATIENT
Start: 2023-01-26 | End: 2023-01-26 | Stop reason: HOSPADM

## 2023-01-26 RX ORDER — FENTANYL CITRATE 50 UG/ML
INJECTION, SOLUTION INTRAMUSCULAR; INTRAVENOUS AS NEEDED
Status: DISCONTINUED | OUTPATIENT
Start: 2023-01-26 | End: 2023-01-26

## 2023-01-26 RX ORDER — OXYCODONE HYDROCHLORIDE 5 MG/1
5 TABLET ORAL EVERY 4 HOURS PRN
Status: DISCONTINUED | OUTPATIENT
Start: 2023-01-26 | End: 2023-01-26 | Stop reason: HOSPADM

## 2023-01-26 RX ORDER — IBUPROFEN 600 MG/1
600 TABLET ORAL EVERY 6 HOURS PRN
Status: DISCONTINUED | OUTPATIENT
Start: 2023-01-26 | End: 2023-01-26

## 2023-01-26 RX ORDER — BUPIVACAINE HYDROCHLORIDE 5 MG/ML
INJECTION, SOLUTION PERINEURAL AS NEEDED
Status: DISCONTINUED | OUTPATIENT
Start: 2023-01-26 | End: 2023-01-26 | Stop reason: HOSPADM

## 2023-01-26 RX ADMIN — ROCURONIUM BROMIDE 20 MG: 10 INJECTION INTRAVENOUS at 14:00

## 2023-01-26 RX ADMIN — LIDOCAINE HYDROCHLORIDE 100 MG: 20 INJECTION, SOLUTION EPIDURAL; INFILTRATION; INTRACAUDAL; PERINEURAL at 13:14

## 2023-01-26 RX ADMIN — FENTANYL CITRATE 50 MCG: 50 INJECTION, SOLUTION INTRAMUSCULAR; INTRAVENOUS at 13:47

## 2023-01-26 RX ADMIN — PROPOFOL 30 MCG/KG/MIN: 10 INJECTION, EMULSION INTRAVENOUS at 13:21

## 2023-01-26 RX ADMIN — MIDAZOLAM HYDROCHLORIDE 1 MG: 1 INJECTION, SOLUTION INTRAMUSCULAR; INTRAVENOUS at 13:03

## 2023-01-26 RX ADMIN — SUGAMMADEX 200 MG: 100 INJECTION, SOLUTION INTRAVENOUS at 16:38

## 2023-01-26 RX ADMIN — KETOROLAC TROMETHAMINE 30 MG: 30 INJECTION, SOLUTION INTRAMUSCULAR; INTRAVENOUS at 17:05

## 2023-01-26 RX ADMIN — CELECOXIB 200 MG: 100 CAPSULE ORAL at 10:24

## 2023-01-26 RX ADMIN — SODIUM CHLORIDE, SODIUM LACTATE, POTASSIUM CHLORIDE, AND CALCIUM CHLORIDE: .6; .31; .03; .02 INJECTION, SOLUTION INTRAVENOUS at 13:08

## 2023-01-26 RX ADMIN — HYDROMORPHONE HYDROCHLORIDE 0.5 MG: 1 INJECTION, SOLUTION INTRAMUSCULAR; INTRAVENOUS; SUBCUTANEOUS at 13:48

## 2023-01-26 RX ADMIN — HYDROMORPHONE HYDROCHLORIDE 0.5 MG: 1 INJECTION, SOLUTION INTRAMUSCULAR; INTRAVENOUS; SUBCUTANEOUS at 15:02

## 2023-01-26 RX ADMIN — MIDAZOLAM HYDROCHLORIDE 1 MG: 1 INJECTION, SOLUTION INTRAMUSCULAR; INTRAVENOUS at 13:08

## 2023-01-26 RX ADMIN — DEXAMETHASONE SODIUM PHOSPHATE 10 MG: 10 INJECTION, SOLUTION INTRAMUSCULAR; INTRAVENOUS at 13:14

## 2023-01-26 RX ADMIN — PROPOFOL 200 MG: 10 INJECTION, EMULSION INTRAVENOUS at 13:14

## 2023-01-26 RX ADMIN — ACETAMINOPHEN 975 MG: 325 TABLET, FILM COATED ORAL at 10:24

## 2023-01-26 RX ADMIN — SODIUM CHLORIDE, SODIUM LACTATE, POTASSIUM CHLORIDE, CALCIUM CHLORIDE: 600; 310; 30; 20 INJECTION, SOLUTION INTRAVENOUS at 13:20

## 2023-01-26 RX ADMIN — CEFAZOLIN SODIUM 2000 MG: 2 SOLUTION INTRAVENOUS at 13:08

## 2023-01-26 RX ADMIN — SODIUM CHLORIDE, SODIUM LACTATE, POTASSIUM CHLORIDE, AND CALCIUM CHLORIDE: .6; .31; .03; .02 INJECTION, SOLUTION INTRAVENOUS at 14:39

## 2023-01-26 RX ADMIN — FENTANYL CITRATE 50 MCG: 50 INJECTION, SOLUTION INTRAMUSCULAR; INTRAVENOUS at 13:14

## 2023-01-26 RX ADMIN — ROCURONIUM BROMIDE 50 MG: 10 INJECTION INTRAVENOUS at 13:15

## 2023-01-26 RX ADMIN — ENOXAPARIN SODIUM 40 MG: 40 INJECTION SUBCUTANEOUS at 10:25

## 2023-01-26 RX ADMIN — ONDANSETRON 4 MG: 2 INJECTION INTRAMUSCULAR; INTRAVENOUS at 13:14

## 2023-01-26 RX ADMIN — GABAPENTIN 100 MG: 100 CAPSULE ORAL at 10:24

## 2023-01-26 RX ADMIN — OXYCODONE HYDROCHLORIDE 10 MG: 5 TABLET ORAL at 18:37

## 2023-01-26 NOTE — OP NOTE
OPERATIVE REPORT  PATIENT NAME: Spencer Mcdaniel    :  1960  MRN: [id number]  Pt Location: AN OR ROOM 04    SURGERY DATE: 2023    Surgeon(s) and Role:     * Christianne Garcia MD - Primary     * Susanna Connors MD - Assisting     * Judy Pascual PA-C - Assisting    Preop Diagnosis:  Endometrial cancer ([address] ) [C54 1]    Post-Op Diagnosis Codes:     * Endometrial cancer ([address] ) [C54 1]    Procedure(s): HYSTERECTOMY LAPAROSCOPIC TOTAL ([address]) W/ ROBOTICS  BILATERAL SALPINGO-OOPHORECTOMY  SENTINTEL LYMPH NODE MAPPING AND BIOPSY  EXCISION BIOPSY MASS/LESION VULVAR    Specimen(s):  ID Type Source Tests Collected by Time Destination   1 : vulva biopsy 1 oclock Tissue Vulva TISSUE EXAM Christianne Garcia MD 2023 1356    2 : pelvic washings Washing Pelvic Washing NON-GYNECOLOGIC CYTOLOGY Christianne Garcia MD 2023 1412    3 : right pelvic sentinel lymph node bundle Tissue Lymph Node, [place] TISSUE EXAM Christianne Garcia MD 2023 1441    4 : left pelvic sentinel lymph node bundle Tissue Lymph Node, [place] TISSUE EXAM Christianne Garcia MD 2023 1444    5 : uterus, cervix, bilateral fallopian tubes, bilateral ovaries Tissue Uterus w/Bilateral Ovaries and Fallopian Tubes TISSUE EXAM Christianne Garcia MD 2023 1519        Estimated Blood Loss:   Minimal    Drains:  Urethral Catheter Non-latex 16 Fr  (Active)   Number of days: 0       Anesthesia Type:   General    Operative Indications:  Endometrial cancer ([facility] [address] ) [C54 1]  Vulvar Lesion    Operative Findings:  Exam under anesthesia revealed loss of labia minora bilaterally and excoriation of the pelvic all consistent with lichen sclerosis  A 4 mm punch biopsy at the 1:00 region was performed without difficulty  Upon opening the abdomen uterus cervix tubes and ovaries were unremarkable  Lymph nodes were unremarkable  Uterus was markedly enlarged consistent with fibroid uterus  The weight was 417 g making this a difficult case    The uterus was placed in an Endo Catch bag and when removing through the vagina the cervix fractured  This leaked intrauterine contents within the bag but not within the abdomen or vagina  A small segment of the uterus was morcellated within the bag again no fragments were leaked into the vagina or the abdomen  This was to facilitate removal     Complications:   None    Procedure and Technique:    The patient was identified as herself and and appropriate time-out procedure was performed  The patient was placed in dorsal lithotomy position and prepped and draped in the usual sterile fashion including a 3 times vaginal Chlorhexadine prep  Attention was turned to the vagina where a sterile by valve speculum was placed into the vagina  The cervix was grasped with a single tooth tenaculum  I personallly injected the cervix at the 3 o'clock and 9 o'clock sites with both the deep and superficial injections of ICG with a spinal needle  Mitchell catheter was placed without difficulty  An EEA sizer was then placed into the vagina without difficulty  Attention was then turned to the abdomen where planned incision sites were marked and infiltrated with 0 5% Marcaine  The periumbilical incision was created with a knife  The Veress needle was placed without difficulty  The abdomen was insufflated with sterile gas  The remainder of the incisions were created with a knife  The 8 mm trocar was placed into the asael umbilical incision and a camera was placed without difficulty  Under direct visualization the 3 other DaVinci 8 mm ports were placed without difficulty  A 5 mm assistant port was placed in the right lower quadrant without difficulty  The patient was placed in steep Trendelenburg position  The robot was brought in and side docked without difficulty  Electrocautery sheers were placed in the number 1 arm, a Vessel Sealer was placed in the 3  Arm, and a Prograsp was placed in the 4  Arm      3 mm punch biopsy was performed from the 1:00 region of the antritis  This was performed with a 3 mm punch without difficulty  I broke scrub an approached the console  The right round ligament was taken down with the Vessel Sealer  The right pelvic sidewall was opened with cautery bean  The right perivesical and pararectal spaces were open  The ICG was tracked using the Firefly scope  The phosphorescent lymph vessels were identified and followed  All identified sentinel nodes by ICG capture were removed and sent as numbered, side specific sentinel lymph nodes  The infundibulopelvic ligament and ureter on the right were  in this retroperitoneal space  The path of the ureter was identified  The infundibulopelvic ligament was then taken down with the Vessel Sealer in multiple bites  The posterior broad ligament was taken down with electrocautery bean  The same procedure was performed on the left-hand side and the left round ligament was taken down in the same fashion  The left paravesicle and perirectal spaces were open, ICG lymph vessels were tracked and the sentinel lymph nodes were removed  The bladder flap was begun with the electrocautery Bean and taken down to the upper vagina  The uterine artery pedicle was skeletonized and taken down with the Vessel Sealer  The cardinal ligament was taken down with sequential bites with the Vessel Sealer  The utererosacral ligament was taken down with the Vessel Sealer  The same procedure was then performed on the patient's left-hand side again taking care to identify the ureter prior to taking down the infundibulopelvic ligament  The vaginal cuff was then taken down with Electrocautery Bean  The specimen was placed in a large Endo Catch bag  The cervix was grasped with a double-tooth tenaculum and brought through the vagina within the bag  In manipulating the cervix the cervix fractured off    Small morcellation pieces of the lower uterine segment were taken off to facilitate removal through the vagina  The entirety of the procedure was performed within the bag leaking no fragments into the abdomen or vagina  The specimen was retrieved through the vagina with a single tooth tenaculum  The vaginal cuff was closed with a running suture of 2 0 Stratafix  The abdomen was explored and no further bleeding sites were noted  The pneumoperitoneum was allowed to escape and no bleeding was noted  All instruments were removed  The trocars were removed  The incision sites were closed with 4 0 Monocryl without difficulty  The patient tolerated procedure without complications  The sponge and instrument counts were correct prior to closure  Hemostasis was assured prior to closure  The patient was brought to the recovery room in stable condition      Suleman Li MD  Director of Cancer Survivorship  Division of [address] [place] [address]    I was present for the entire procedure    Patient Disposition:  PACU     This procedure was not performed to treat cancer         SIGNATURE: Suleman Li MD  DATE: January 26, 2023  TIME: 4:40 PM

## 2023-01-26 NOTE — ANESTHESIA POSTPROCEDURE EVALUATION
Post-Op Assessment Note    CV Status:  Stable  Pain Score: 0    Pain management: adequate     Mental Status:  Awake   Hydration Status:  Euvolemic   PONV Controlled:  Controlled   Airway Patency:  Patent  Airway: intubated      Post Op Vitals Reviewed: Yes      Staff: CRNA         No notable events documented      BP   173/81   Temp   97 9   Pulse  86   Resp   18   SpO2   98

## 2023-01-26 NOTE — ANESTHESIA PREPROCEDURE EVALUATION
Medical History  History Comments   Asthma    GERD (gastroesophageal reflux disease)    Visual impairment macular hole left eye & cataracts   Mixed hyperlipidemia    Anxiety 2nd marriage/1992? Arthritis    Depression Every now and then   Obesity Out of    Sleep apnea pt was advised to get sleep study and never completed   Bronchitis      Procedure:  HYSTERECTOMY LAPAROSCOPIC TOTAL ([address]) W/ ROBOTICS, BILATERAL SALPINGO-OOPHORECTOMY, SENTINTEL LYMPH NODE MAPPING AND BIOPSY (Abdomen)  EXCISION BIOPSY MASS/LESION VULVAR (Vulva)    Relevant Problems   ANESTHESIA (within normal limits)      CARDIO   (+) Mixed hyperlipidemia      GYN   (+) Endometrial cancer (HCC)      NEURO/PSYCH   (+) Anxiety   (+) Current moderate episode of major depressive disorder without prior episode (HCC)        Physical Exam    Airway    Mallampati score: III  TM Distance: >3 FB  Neck ROM: full     Dental   No notable dental hx     Cardiovascular  Rate: normal,     Pulmonary  Pulmonary exam normal     Other Findings        Anesthesia Plan  ASA Score- 3     Anesthesia Type- general with ASA Monitors  Additional Monitors:   Airway Plan: ETT  Plan Factors-Exercise tolerance (METS): >4 METS  Chart reviewed  Patient summary reviewed  Patient is not a current smoker  Induction- intravenous  Postoperative Plan- Plan for postoperative opioid use  Informed Consent- Anesthetic plan and risks discussed with patient  I personally reviewed this patient with the CRNA  Discussed and agreed on the Anesthesia Plan with the CRNA  Ozzie Morris

## 2023-01-26 NOTE — DISCHARGE INSTRUCTIONS
Discharge Instructions    WHAT YOU NEED TO KNOW:     DISCHARGE INSTRUCTIONS:   Contact your doctor at the number above if:   You have a fever over 101o  You have nausea or are vomiting that does not improve after a light meal    Your pain is getting worse, even after you take medicine  You feel pain or burning when you urinate, or you have trouble urinating  You have pus or a foul-smelling odor coming from your vagina and/or wound  Your wound is red, swollen, or draining pus  You see new or an increased amount of bright red blood coming from your vagina or your incisions  You have questions or concerns about your condition or care  Seek care immediately:   Your arm or leg feels warm, tender, and painful  It may look swollen and red  You have increasing abdominal or pelvic pain  You have heavy vaginal bleeding that fills 1 or more sanitary pads in 1 hour  Call 911 for any of the following: You feel lightheaded, short of breath, and have chest pain  You cough up blood  Medicines: You may need any of the following:  Prescription medicine will not be given for this procedure  Resume your previous medications as directed  Extra-strength Tylenol: This medications over-the-counter  Take 2 tablets every 6 hours as needed for mild-to-moderate pain  Ibuprofen/Advil/Motrin 200 mg tablets: This medication is over-the-counter  Take 3 tablets every 8 hours as needed for moderate to severe pain  Take this medication with food  The drink plenty of fluids while using this medication to prevent kidney injury  Metamucil or MiraLax: This product is over-the-counter  Distal 1 large tbsp in a large glass of water  Use once or twice a day for 1-2 weeks prevent and or treat constipation  Take your medicines as directed  Contact your healthcare provider if you think your medicine is not helping or if you have side effects  Tell him or her if you are allergic to any medicine   Keep a list of the medicines, vitamins, and herbs you take  Include the amounts, and when and why you take them  Bring the list or the pill bottles to follow-up visits  Carry your medicine list with you in case of an emergency  Activity:   Rest as needed  Get up and move around as directed to help prevent blood clots  Start with short walks and slowly increase the distance every day  Limit the number of times you climb stairs to 2 times each day for the first week  Plan most of your daily activities on one level of your home  Do not lift objects heavier than 10 pounds until seen in the office for your follow-up appointment  Do not strain during bowel movements  High-fiber foods and extra liquids can help you prevent constipation  Examples of high-fiber foods are fruit and bran  Prune juice and water are good liquids to drink  Do not have sex, use tampons, or douche and do not do tub baths/swimming until cleared by your physician at your postoperative appointment  You may shower as soon as the day after surgery  Do not go into pools or hot tubs until cleared by your doctor  Ask when it is safe for you to drive  It is generally safe to drive as soon as your legs are strong, you are off pain medicines and you feel like you will have the appropriate reflexes to step on the breaks in case of an emergency  Ask when you may return to work and to other regular activities  Wound care: Care for your abdominal incisions as directed  Carefully wash around the wound with soap and water  If you have Hibiclens or medicated soap that you were instructed to use before surgery, you may use that to wash with for up to 2 days after surgery  If not, any mild non-scented, non-abrasive soap is safe  It is okay to let the soap and water run over your incision  Do not scrub your incision  Dry the area and put on new, clean bandages as directed  Change your bandages when they get wet or dirty   If you have strips of medical tape, let them fall off on their own  It may take 7 to 14 days for them to fall off  Check your incision every day for redness, swelling, or pus  Deep breathing: Take deep breaths and cough 10 times each hour  This will decrease your risk for a lung infection  Take a deep breath and hold it for as long as you can  Let the air out and then cough strongly  Deep breaths help open your airway  You may be given an incentive spirometer to help you take deep breaths  Put the plastic piece in your mouth and take a slow, deep breath, then let the air out and cough  Repeat these steps 10 times every hour  Get support: This surgery may be life-changing for you and your family  You will no longer be able to get pregnant  Sudden changes in the levels of your hormones may occur and cause mood swings and depression  You may feel angry, sad, or frightened, or cry frequently and unexpectedly  These feelings are normal  Talk to your healthcare provider about where you can get support  You can also ask if hormone replacement medicine is right for you  Follow up with your healthcare provider or gynecologist as directed: You may need to return to have stitches removed, and for other tests  Write down your questions so you remember to ask them during your visits  © 2017 [address] Information is for End User's use only and may not be sold, redistributed or otherwise used for commercial purposes  All illustrations and images included in CareNotes® are the copyrighted property of [employer]  or Demetrius Anderson  The above information is an  only  It is not intended as medical advice for individual conditions or treatments  Talk to your doctor, nurse or pharmacist before following any medical regimen to see if it is safe and effective for you  Postoperative Opioid Use  You may have been prescribed an opioid pain medication to assist with your postoperative pain control   Our goal is for your pain to be controlled, not for you to be completely pain free  Being pain free after surgery takes time  While this medication is excellent at treating postoperative pain, it has several side effects and addictive properties  Here is a suggested plan to minimize opiate use:    Use non-opiate methods of pain control first  Use ice packs over your incision to reduce pain and swelling  This will help minimize the need for medications  For the first few days after your surgery, take 650 mg of Acetaminophen (Tylenol) every 6 hours regularly  In addition, take 600 mg of Ibuprofen (Motrin) every 6 hours regularly  Using these medications will help keep you from getting into severe pain and can reduce how much narcotic pain medication you need even if they are not enough to control your pain  Use opiate medication when the other methods are not adequate  For the first few days after surgery, you may need additional pain relief   You may take your opiate medication every 4-6 hours  This is the first medication you should stop taking as your pain improves  After the first few days, you should not require opiate medications  You may continue to take 650 mg of Acetaminophen (Tylenol) every 6 hours regularly  You may take Ibuprofen (Motrin) 600 mg as needed for additional pain relief  If you have unused tablets of the opiate medication, see below  Do not take more than 4,000 mg of Acetaminophen (Tylenol) in a 24 hour period  Please ask your doctor for guidance on amount of Acetaminophen (Tylenol) it is safe to take if you have known liver disease  If you have a sensitive stomach, please take Ibuprofen (Motrin) with food  Please ask your doctor for guidance on safety of Ibuprofen (Motrin) if you have known kidney disease or if you have a history of weight loss surgery      Side effects of opioids  Do not drive or operate heavy machinery while using opioid pain medications   A few days of postoperative opioid use is safe while breastfeeding   Opioids can cause constipation  Please be sure to drink lots of water, and your doctor may recommend use of a stool softener, such as docusate sodium (Colace) 100 mg twice a day or Senna 8 6 mg every night  Disposing of your unused pills  It is very likely that you will not need all of the pills you have been prescribed  They should not be used to treat other pain  They should not be shared with other people  They should not be saved  It is not safe to take  medications  It is not safe to keep them around the house  It is not safe to keep them within reach of children or pets  You should dispose of unused pills by taking them to a Controlled Substance Disposal Site  Controlled Substance Disposal Sites   The following locations are controlled substance disposal sites  You may walk in to any location with unused pills and dispose of them safely and anonymously  Pharmacies that accept unused pills  [facility]  [address] [place] [address], [place] [address]   [address], [address] [facility] [address], [place] [address] [address] [place], [address]   [address] [facility], [address] [facility]  Please call your local pharmacy to see if they also have a Controlled Substance Disposal location and are not on this list     Police departments may accept unused pills  Most local police departments have a drop box on their premises  Please contact your local police department for more information  Information can also be found here: https://safe  pharmacy/drug-disposal/ [safe  pharmacy]    National Prescription Drug Take Back Day  There is a biannual National Prescription Drug Take Back Day, usually in April and October, when there are more sites available than usual, including most [facility]   Information for that can be found here: https://takebackday ashley gov/ [takebackday ashley gov]  You can find more information about prescription disposal here:   https://safe  pharmacy/drug-disposal/ [safe  pharmacy]   [employer]  aspx [ddap pa gov]    Thank you

## 2023-01-26 NOTE — INTERVAL H&P NOTE
H&P reviewed  After examining the patient I find no changes in the patients condition since the H&P had been written  Plan robotic hysterectomy BSO and sentinel lymph node mapping and biopsy for early stage endometrial cancer  Preoperative testing including CT scan of the chest abdomen and pelvis as well as duplex study were unremarkable  All preoperative testing is stable for surgery today  There were no vitals filed for this visit

## 2023-01-30 RX ORDER — IBUPROFEN 600 MG/1
600 TABLET ORAL EVERY 6 HOURS PRN
Qty: 30 TABLET | Refills: 0 | Status: SHIPPED | OUTPATIENT
Start: 2023-01-30

## 2023-02-07 ENCOUNTER — TELEPHONE (OUTPATIENT)
Dept: GYNECOLOGIC ONCOLOGY | Facility: CLINIC | Age: 63
End: 2023-02-07

## 2023-02-07 NOTE — TELEPHONE ENCOUNTER
Called to verify if patient will be at her appointment tomorrow with Dr Gris Villaseñor Seat to return my phone call and left the number

## 2023-02-10 ENCOUNTER — DOCUMENTATION (OUTPATIENT)
Dept: HEMATOLOGY ONCOLOGY | Facility: CLINIC | Age: 63
End: 2023-02-10

## 2023-02-10 NOTE — PROGRESS NOTES
In-basket message received from Dr Rodney Osorio to add pt to 2/13/2023 tumor board  Chart Reviewed and tumor board prep completed

## 2023-06-07 ENCOUNTER — PATIENT OUTREACH (OUTPATIENT)
Dept: CASE MANAGEMENT | Facility: HOSPITAL | Age: 63
End: 2023-06-07

## 2023-06-07 NOTE — PROGRESS NOTES
Closing OncSW episode at this time, no contact with pt since Jan 2023  MSW will remain available to her as needed moving forward

## (undated) DEVICE — CHLORAPREP HI-LITE 26ML ORANGE

## (undated) DEVICE — CHLORHEXIDINE 4PCT 4 OZ

## (undated) DEVICE — GLOVE PI ULTRA TOUCH SZ 6

## (undated) DEVICE — TROCAR PORT ACCESS 8 X120MML W BLDLS OPTICAL TIP AIRSEAL

## (undated) DEVICE — SUT VICRYL 3-0 SH 27 IN J416H

## (undated) DEVICE — TISSUE REMOVAL SYSTEM FLUID MANAGEMENT ACCESSORIES: Brand: SYMPHION

## (undated) DEVICE — TRAY FOLEY 16FR URIMETER SILICONE SURESTEP

## (undated) DEVICE — CANNULA SEAL

## (undated) DEVICE — PVC URETHRAL CATHETER: Brand: DOVER

## (undated) DEVICE — Device: Brand: TISSUE RETRIEVAL SYSTEM

## (undated) DEVICE — DRAPE EQUIPMENT RF WAND

## (undated) DEVICE — PREMIUM DRY TRAY LF: Brand: MEDLINE INDUSTRIES, INC.

## (undated) DEVICE — ASTOUND STANDARD SURGICAL GOWN, XL: Brand: CONVERTORS

## (undated) DEVICE — 40595 XL TRENDELENBURG POSITIONING KIT: Brand: 40595 XL TRENDELENBURG POSITIONING KIT

## (undated) DEVICE — TROCAR PORT ACCESS 12 X120MM W/BLDLS OPTICAL TIP AIRSEAL

## (undated) DEVICE — STERILE SURGICAL LUBRICANT,  TUBE: Brand: SURGILUBE

## (undated) DEVICE — TOWEL SURG XR DETECT GREEN STRL RFD

## (undated) DEVICE — PROGRASP FORCEPS: Brand: ENDOWRIST

## (undated) DEVICE — COLUMN DRAPE

## (undated) DEVICE — SUT MONOCRYL 4-0 PS-2 27 IN Y426H

## (undated) DEVICE — HEAVY DUTY TABLE COVER: Brand: CONVERTORS

## (undated) DEVICE — TIP COVER ACCESSORY

## (undated) DEVICE — MONOPOLAR CURVED SCISSORS: Brand: ENDOWRIST

## (undated) DEVICE — SPONGE 4 X 4 XRAY 16 PLY STRL LF RFD

## (undated) DEVICE — DRAPE SHEET THREE QUARTER

## (undated) DEVICE — NEEDLE 25G X 1 1/2

## (undated) DEVICE — DISPOSABLE BRIEF/UNDERWEAR

## (undated) DEVICE — BLADELESS OBTURATOR: Brand: WECK VISTA

## (undated) DEVICE — VESSEL SEALER EXTEND: Brand: ENDOWRIST

## (undated) DEVICE — MEGA SUTURECUT ND: Brand: ENDOWRIST

## (undated) DEVICE — KIT, BETHLEHEM ROBOTIC PROST: Brand: CARDINAL HEALTH

## (undated) DEVICE — AIRSEAL TUBE SMOKE EVAC LUMENX3 FILTERED

## (undated) DEVICE — GLOVE PI ULTRA TOUCH SZ.7.5

## (undated) DEVICE — GLOVE INDICATOR PI UNDERGLOVE SZ 7.5 BLUE

## (undated) DEVICE — SKIN MARKER DUAL TIP WITH RULER CAP, FLEXIBLE RULER AND LABELS: Brand: DEVON

## (undated) DEVICE — STRL ALLENTOWN HYSTEROSCOPY PK: Brand: CARDINAL HEALTH

## (undated) DEVICE — INTENDED FOR TISSUE SEPARATION, AND OTHER PROCEDURES THAT REQUIRE A SHARP SURGICAL BLADE TO PUNCTURE OR CUT.: Brand: BARD-PARKER ® CARBON RIB-BACK BLADES

## (undated) DEVICE — 1820 FOAM BLOCK NEEDLE COUNTER: Brand: DEVON

## (undated) DEVICE — LIGHT HANDLE COVER SLEEVE DISP BLUE STELLAR

## (undated) DEVICE — INTENDED FOR TISSUE SEPARATION, AND OTHER PROCEDURES THAT REQUIRE A SHARP SURGICAL BLADE TO PUNCTURE OR CUT.: Brand: BARD-PARKER SAFETY BLADES SIZE 11, STERILE

## (undated) DEVICE — 3M™ STERI-DRAPE™ UNDER BUTTOCKS DRAPE WITH POUCH 1084: Brand: STERI-DRAPE™

## (undated) DEVICE — ENDOPATH PNEUMONEEDLE INSUFFLATION NEEDLES WITH LUER LOCK CONNECTORS 150MM: Brand: ENDOPATH

## (undated) DEVICE — GLOVE INDICATOR PI UNDERGLOVE SZ 7 BLUE

## (undated) DEVICE — SUT VICRYL 2-0 SH 27 IN UNDYED J417H

## (undated) DEVICE — VISUALIZATION SYSTEM: Brand: CLEARIFY

## (undated) DEVICE — ARM DRAPE

## (undated) DEVICE — PLUMEPEN PRO 10FT

## (undated) DEVICE — SYRINGE 10ML LL

## (undated) DEVICE — MAXI PAD5.51 X 13.78 IN. (14.0 X 35.0 CM)HEAVYCONTOUREDUNSCENTED: Brand: CURITY

## (undated) DEVICE — SUT STRATAFIX SPIRAL 2-0 CT-1 30 CM SXPP1B410

## (undated) DEVICE — GLOVE INDICATOR PI UNDERGLOVE SZ 6.5 BLUE

## (undated) DEVICE — VIAL DECANTER

## (undated) DEVICE — SYRINGE BULB 2 OZ

## (undated) DEVICE — ADHESIVE SKIN HIGH VISCOSITY EXOFIN 1ML